# Patient Record
Sex: FEMALE | Race: WHITE | Employment: FULL TIME | URBAN - NONMETROPOLITAN AREA
[De-identification: names, ages, dates, MRNs, and addresses within clinical notes are randomized per-mention and may not be internally consistent; named-entity substitution may affect disease eponyms.]

---

## 2017-01-19 DIAGNOSIS — F33.9 EPISODE OF RECURRENT MAJOR DEPRESSIVE DISORDER, UNSPECIFIED DEPRESSION EPISODE SEVERITY (HCC): Chronic | ICD-10-CM

## 2017-01-23 RX ORDER — DULOXETIN HYDROCHLORIDE 60 MG/1
60 CAPSULE, DELAYED RELEASE ORAL DAILY
Qty: 30 CAPSULE | Refills: 0 | Status: SHIPPED | OUTPATIENT
Start: 2017-01-23 | End: 2017-01-26 | Stop reason: SDUPTHER

## 2017-01-26 ENCOUNTER — OFFICE VISIT (OUTPATIENT)
Dept: FAMILY MEDICINE CLINIC | Age: 42
End: 2017-01-26

## 2017-01-26 VITALS
DIASTOLIC BLOOD PRESSURE: 82 MMHG | HEIGHT: 67 IN | BODY MASS INDEX: 37.83 KG/M2 | HEART RATE: 80 BPM | WEIGHT: 241 LBS | SYSTOLIC BLOOD PRESSURE: 128 MMHG

## 2017-01-26 DIAGNOSIS — F33.9 EPISODE OF RECURRENT MAJOR DEPRESSIVE DISORDER, UNSPECIFIED DEPRESSION EPISODE SEVERITY (HCC): Chronic | ICD-10-CM

## 2017-01-26 DIAGNOSIS — M54.50 LUMBAR PAIN WITH RADIATION DOWN RIGHT LEG: ICD-10-CM

## 2017-01-26 DIAGNOSIS — M79.604 LUMBAR PAIN WITH RADIATION DOWN RIGHT LEG: ICD-10-CM

## 2017-01-26 PROCEDURE — G8484 FLU IMMUNIZE NO ADMIN: HCPCS | Performed by: FAMILY MEDICINE

## 2017-01-26 PROCEDURE — 99213 OFFICE O/P EST LOW 20 MIN: CPT | Performed by: FAMILY MEDICINE

## 2017-01-26 PROCEDURE — 4004F PT TOBACCO SCREEN RCVD TLK: CPT | Performed by: FAMILY MEDICINE

## 2017-01-26 PROCEDURE — G8427 DOCREV CUR MEDS BY ELIG CLIN: HCPCS | Performed by: FAMILY MEDICINE

## 2017-01-26 PROCEDURE — G8419 CALC BMI OUT NRM PARAM NOF/U: HCPCS | Performed by: FAMILY MEDICINE

## 2017-01-26 RX ORDER — GABAPENTIN 300 MG/1
300 CAPSULE ORAL NIGHTLY PRN
Qty: 90 CAPSULE | Refills: 1 | Status: SHIPPED | OUTPATIENT
Start: 2017-01-26 | End: 2017-12-08

## 2017-01-26 RX ORDER — LAMOTRIGINE 50 MG/1
50 TABLET, ORALLY DISINTEGRATING ORAL NIGHTLY
Qty: 30 TABLET | Refills: 1 | Status: SHIPPED | OUTPATIENT
Start: 2017-01-26 | End: 2017-12-08

## 2017-01-26 RX ORDER — DULOXETIN HYDROCHLORIDE 60 MG/1
60 CAPSULE, DELAYED RELEASE ORAL DAILY
Qty: 90 CAPSULE | Refills: 1 | Status: SHIPPED | OUTPATIENT
Start: 2017-01-26 | End: 2017-08-20 | Stop reason: SDUPTHER

## 2017-01-26 RX ORDER — LORAZEPAM 1 MG/1
1 TABLET ORAL NIGHTLY PRN
Qty: 30 TABLET | Refills: 0 | Status: SHIPPED | OUTPATIENT
Start: 2017-01-26 | End: 2017-12-08

## 2017-01-26 ASSESSMENT — ENCOUNTER SYMPTOMS
SHORTNESS OF BREATH: 0
RESPIRATORY NEGATIVE: 1
GASTROINTESTINAL NEGATIVE: 1

## 2017-04-20 ENCOUNTER — OFFICE VISIT (OUTPATIENT)
Dept: FAMILY MEDICINE CLINIC | Age: 42
End: 2017-04-20

## 2017-04-20 VITALS
BODY MASS INDEX: 38.45 KG/M2 | HEIGHT: 67 IN | HEART RATE: 76 BPM | SYSTOLIC BLOOD PRESSURE: 150 MMHG | DIASTOLIC BLOOD PRESSURE: 97 MMHG | WEIGHT: 245 LBS

## 2017-04-20 DIAGNOSIS — M54.50 LUMBAR PAIN WITH RADIATION DOWN RIGHT LEG: ICD-10-CM

## 2017-04-20 DIAGNOSIS — M79.604 LUMBAR PAIN WITH RADIATION DOWN RIGHT LEG: ICD-10-CM

## 2017-04-20 DIAGNOSIS — S22.32XA CLOSED FRACTURE OF ONE RIB OF LEFT SIDE, INITIAL ENCOUNTER: Primary | ICD-10-CM

## 2017-04-20 DIAGNOSIS — M54.42 ACUTE BILATERAL LOW BACK PAIN WITH LEFT-SIDED SCIATICA: ICD-10-CM

## 2017-04-20 PROCEDURE — G8427 DOCREV CUR MEDS BY ELIG CLIN: HCPCS | Performed by: FAMILY MEDICINE

## 2017-04-20 PROCEDURE — 4004F PT TOBACCO SCREEN RCVD TLK: CPT | Performed by: FAMILY MEDICINE

## 2017-04-20 PROCEDURE — G8417 CALC BMI ABV UP PARAM F/U: HCPCS | Performed by: FAMILY MEDICINE

## 2017-04-20 PROCEDURE — 99213 OFFICE O/P EST LOW 20 MIN: CPT | Performed by: FAMILY MEDICINE

## 2017-04-20 RX ORDER — HYDROCODONE BITARTRATE AND ACETAMINOPHEN 5; 325 MG/1; MG/1
1 TABLET ORAL EVERY 6 HOURS PRN
Qty: 100 TABLET | Refills: 0 | Status: SHIPPED | OUTPATIENT
Start: 2017-04-20 | End: 2017-12-08

## 2017-04-20 RX ORDER — TIZANIDINE 4 MG/1
4 TABLET ORAL EVERY 8 HOURS PRN
Qty: 90 TABLET | Refills: 1 | Status: SHIPPED | OUTPATIENT
Start: 2017-04-20 | End: 2017-07-18 | Stop reason: SDUPTHER

## 2017-07-18 DIAGNOSIS — M54.50 LUMBAR PAIN WITH RADIATION DOWN RIGHT LEG: ICD-10-CM

## 2017-07-18 DIAGNOSIS — M79.604 LUMBAR PAIN WITH RADIATION DOWN RIGHT LEG: ICD-10-CM

## 2017-07-19 RX ORDER — TIZANIDINE 4 MG/1
TABLET ORAL
Qty: 90 TABLET | Refills: 1 | Status: SHIPPED | OUTPATIENT
Start: 2017-07-19 | End: 2017-12-08

## 2017-08-20 DIAGNOSIS — F33.9 EPISODE OF RECURRENT MAJOR DEPRESSIVE DISORDER, UNSPECIFIED DEPRESSION EPISODE SEVERITY (HCC): Chronic | ICD-10-CM

## 2017-08-21 RX ORDER — DULOXETIN HYDROCHLORIDE 60 MG/1
CAPSULE, DELAYED RELEASE ORAL
Qty: 30 CAPSULE | Refills: 0 | Status: SHIPPED | OUTPATIENT
Start: 2017-08-21 | End: 2017-10-12 | Stop reason: SDUPTHER

## 2017-10-12 DIAGNOSIS — F33.9 EPISODE OF RECURRENT MAJOR DEPRESSIVE DISORDER, UNSPECIFIED DEPRESSION EPISODE SEVERITY (HCC): Chronic | ICD-10-CM

## 2017-10-12 RX ORDER — DULOXETIN HYDROCHLORIDE 60 MG/1
CAPSULE, DELAYED RELEASE ORAL
Qty: 30 CAPSULE | Refills: 0 | Status: SHIPPED | OUTPATIENT
Start: 2017-10-12 | End: 2017-12-08 | Stop reason: SDUPTHER

## 2017-11-22 DIAGNOSIS — F33.9 EPISODE OF RECURRENT MAJOR DEPRESSIVE DISORDER, UNSPECIFIED DEPRESSION EPISODE SEVERITY (HCC): Chronic | ICD-10-CM

## 2017-11-22 RX ORDER — DULOXETIN HYDROCHLORIDE 60 MG/1
CAPSULE, DELAYED RELEASE ORAL
Qty: 30 CAPSULE | Refills: 0 | OUTPATIENT
Start: 2017-11-22

## 2017-11-22 NOTE — TELEPHONE ENCOUNTER
Ramiro Rodriguez called requesting a refill on the following medications:  Requested Prescriptions     Pending Prescriptions Disp Refills    DULoxetine (CYMBALTA) 60 MG extended release capsule 30 capsule 0     Sig: take 1 capsule by mouth once daily     Pharmacy verified:  [email or web address]      Date of last visit: 4/20/2017  Date of next visit (if applicable): Visit date not found

## 2017-12-08 ENCOUNTER — OFFICE VISIT (OUTPATIENT)
Dept: FAMILY MEDICINE CLINIC | Age: 42
End: 2017-12-08
Payer: COMMERCIAL

## 2017-12-08 VITALS
BODY MASS INDEX: 38.45 KG/M2 | HEART RATE: 86 BPM | HEIGHT: 67 IN | DIASTOLIC BLOOD PRESSURE: 90 MMHG | WEIGHT: 245 LBS | SYSTOLIC BLOOD PRESSURE: 158 MMHG

## 2017-12-08 DIAGNOSIS — I10 ESSENTIAL HYPERTENSION: Primary | ICD-10-CM

## 2017-12-08 DIAGNOSIS — F33.9 EPISODE OF RECURRENT MAJOR DEPRESSIVE DISORDER, UNSPECIFIED DEPRESSION EPISODE SEVERITY (HCC): Chronic | ICD-10-CM

## 2017-12-08 PROCEDURE — 99213 OFFICE O/P EST LOW 20 MIN: CPT | Performed by: FAMILY MEDICINE

## 2017-12-08 PROCEDURE — G8484 FLU IMMUNIZE NO ADMIN: HCPCS | Performed by: FAMILY MEDICINE

## 2017-12-08 PROCEDURE — G8417 CALC BMI ABV UP PARAM F/U: HCPCS | Performed by: FAMILY MEDICINE

## 2017-12-08 PROCEDURE — G8427 DOCREV CUR MEDS BY ELIG CLIN: HCPCS | Performed by: FAMILY MEDICINE

## 2017-12-08 PROCEDURE — 4004F PT TOBACCO SCREEN RCVD TLK: CPT | Performed by: FAMILY MEDICINE

## 2017-12-08 RX ORDER — QUETIAPINE FUMARATE 25 MG/1
25 TABLET, FILM COATED ORAL 2 TIMES DAILY
Qty: 60 TABLET | Refills: 1 | Status: SHIPPED | OUTPATIENT
Start: 2017-12-08 | End: 2018-03-12

## 2017-12-08 RX ORDER — LISINOPRIL 5 MG/1
5 TABLET ORAL DAILY
Qty: 90 TABLET | Refills: 1 | Status: SHIPPED | OUTPATIENT
Start: 2017-12-08 | End: 2018-05-24 | Stop reason: SDUPTHER

## 2017-12-08 RX ORDER — DULOXETIN HYDROCHLORIDE 60 MG/1
CAPSULE, DELAYED RELEASE ORAL
Qty: 90 CAPSULE | Refills: 1 | Status: SHIPPED | OUTPATIENT
Start: 2017-12-08 | End: 2018-05-24 | Stop reason: SDUPTHER

## 2017-12-08 NOTE — PROGRESS NOTES
Name:  Cristian Huertas  :    1975    Chief Complaint   Patient presents with    Medication Refill       HPI:  Having a lot more stress. Changed jobs and happy with that.  has alcohol issues. She did better on Cymbalta and wants to continue. She does not follow her own medical issues well. Known goiter, but no US done. No lobas. She has followed BP that is high and she has avoided treatment. Active. Good diet. Physical Exam:      BP (!) 158/90 (Site: Left Arm, Position: Sitting, Cuff Size: Large Adult)   Pulse 86   Ht 5' 7\" (1.702 m)   Wt 245 lb (111.1 kg)   BMI 38.37 kg/m²     Anxious. Logical.  Some anger. No tremors. Neck with large goiter. Non tender. No nodes. Lungs are clear. Heart in RRR with no murmur. No tremors. No edema. Assessment/Plan:      Depression/PETAR  Goiter  New HTN    Continue Cymbalta. Add PRN Seroquel. Start Prinivil. Jason Olson was seen today for medication refill. Diagnoses and all orders for this visit:    Essential hypertension  -     lisinopril (PRINIVIL;ZESTRIL) 5 MG tablet; Take 1 tablet by mouth daily    Episode of recurrent major depressive disorder, unspecified depression episode severity (HCC)  -     DULoxetine (CYMBALTA) 60 MG extended release capsule; take 1 capsule by mouth once daily  -     QUEtiapine (SEROQUEL) 25 MG tablet;  Take 1 tablet by mouth 2 times daily

## 2018-03-12 ENCOUNTER — OFFICE VISIT (OUTPATIENT)
Dept: FAMILY MEDICINE CLINIC | Age: 43
End: 2018-03-12
Payer: COMMERCIAL

## 2018-03-12 VITALS
HEART RATE: 88 BPM | SYSTOLIC BLOOD PRESSURE: 138 MMHG | BODY MASS INDEX: 39.62 KG/M2 | DIASTOLIC BLOOD PRESSURE: 86 MMHG | WEIGHT: 252.4 LBS | HEIGHT: 67 IN

## 2018-03-12 DIAGNOSIS — F43.0 ACUTE REACTION TO SITUATIONAL STRESS: Primary | ICD-10-CM

## 2018-03-12 PROCEDURE — 4004F PT TOBACCO SCREEN RCVD TLK: CPT | Performed by: FAMILY MEDICINE

## 2018-03-12 PROCEDURE — G8417 CALC BMI ABV UP PARAM F/U: HCPCS | Performed by: FAMILY MEDICINE

## 2018-03-12 PROCEDURE — G8484 FLU IMMUNIZE NO ADMIN: HCPCS | Performed by: FAMILY MEDICINE

## 2018-03-12 PROCEDURE — 99213 OFFICE O/P EST LOW 20 MIN: CPT | Performed by: FAMILY MEDICINE

## 2018-03-12 PROCEDURE — G8427 DOCREV CUR MEDS BY ELIG CLIN: HCPCS | Performed by: FAMILY MEDICINE

## 2018-03-12 RX ORDER — LORAZEPAM 1 MG/1
1 TABLET ORAL 2 TIMES DAILY PRN
Qty: 20 TABLET | Refills: 1 | Status: SHIPPED | OUTPATIENT
Start: 2018-03-12 | End: 2018-03-22

## 2018-03-12 ASSESSMENT — PATIENT HEALTH QUESTIONNAIRE - PHQ9
2. FEELING DOWN, DEPRESSED OR HOPELESS: 1
1. LITTLE INTEREST OR PLEASURE IN DOING THINGS: 0
SUM OF ALL RESPONSES TO PHQ QUESTIONS 1-9: 1
SUM OF ALL RESPONSES TO PHQ9 QUESTIONS 1 & 2: 1

## 2018-03-12 NOTE — PROGRESS NOTES
Name:  Solomon Aquino  :    1975    Chief Complaint   Patient presents with    Anxiety       HPI:  Doing well in general.  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 He is in ICU and may not recover. She may take home on hospice. Not sleeping. Major anxiety, but no panic. Physical Exam:      /86   Pulse 88   Ht 5' 7\" (1.702 m)   Wt 252 lb 6.4 oz (114.5 kg)   BMI 39.53 kg/m²     Anxious. Lungs are clear. Heart in RRR with no murmur. No tremors. Assessment/Plan:      Situational Reaction. Short Ativan PRN    Controlled Substances Monitoring: The Prescription Monitoring Report for this patient was reviewed today. (Alivia Henning MD)    No signs of potential drug abuse or diversion identified. (Alivia Henning MD)                       There are no diagnoses linked to this encounter.

## 2018-05-24 ENCOUNTER — OFFICE VISIT (OUTPATIENT)
Dept: FAMILY MEDICINE CLINIC | Age: 43
End: 2018-05-24
Payer: COMMERCIAL

## 2018-05-24 VITALS
WEIGHT: 242.2 LBS | BODY MASS INDEX: 38.01 KG/M2 | HEART RATE: 92 BPM | DIASTOLIC BLOOD PRESSURE: 72 MMHG | HEIGHT: 67 IN | SYSTOLIC BLOOD PRESSURE: 138 MMHG

## 2018-05-24 DIAGNOSIS — I10 ESSENTIAL HYPERTENSION: Chronic | ICD-10-CM

## 2018-05-24 DIAGNOSIS — F33.9 EPISODE OF RECURRENT MAJOR DEPRESSIVE DISORDER, UNSPECIFIED DEPRESSION EPISODE SEVERITY (HCC): Chronic | ICD-10-CM

## 2018-05-24 PROCEDURE — G8417 CALC BMI ABV UP PARAM F/U: HCPCS | Performed by: FAMILY MEDICINE

## 2018-05-24 PROCEDURE — 4004F PT TOBACCO SCREEN RCVD TLK: CPT | Performed by: FAMILY MEDICINE

## 2018-05-24 PROCEDURE — 99213 OFFICE O/P EST LOW 20 MIN: CPT | Performed by: FAMILY MEDICINE

## 2018-05-24 PROCEDURE — G8427 DOCREV CUR MEDS BY ELIG CLIN: HCPCS | Performed by: FAMILY MEDICINE

## 2018-05-24 RX ORDER — LISINOPRIL 5 MG/1
5 TABLET ORAL DAILY
Qty: 90 TABLET | Refills: 1 | Status: SHIPPED | OUTPATIENT
Start: 2018-05-24 | End: 2018-08-06 | Stop reason: CLARIF

## 2018-05-24 RX ORDER — DULOXETIN HYDROCHLORIDE 60 MG/1
CAPSULE, DELAYED RELEASE ORAL
Qty: 90 CAPSULE | Refills: 1 | Status: SHIPPED | OUTPATIENT
Start: 2018-05-24 | End: 2018-08-06 | Stop reason: CLARIF

## 2018-05-24 ASSESSMENT — ENCOUNTER SYMPTOMS
RESPIRATORY NEGATIVE: 1
GASTROINTESTINAL NEGATIVE: 1
SHORTNESS OF BREATH: 0

## 2018-08-06 ENCOUNTER — OFFICE VISIT (OUTPATIENT)
Dept: FAMILY MEDICINE CLINIC | Age: 43
End: 2018-08-06
Payer: COMMERCIAL

## 2018-08-06 VITALS
HEART RATE: 80 BPM | DIASTOLIC BLOOD PRESSURE: 80 MMHG | SYSTOLIC BLOOD PRESSURE: 144 MMHG | BODY MASS INDEX: 33.55 KG/M2 | WEIGHT: 214.2 LBS

## 2018-08-06 DIAGNOSIS — F43.22 ADJUSTMENT DISORDER WITH ANXIOUS MOOD: Primary | ICD-10-CM

## 2018-08-06 PROCEDURE — G8427 DOCREV CUR MEDS BY ELIG CLIN: HCPCS | Performed by: FAMILY MEDICINE

## 2018-08-06 PROCEDURE — 99213 OFFICE O/P EST LOW 20 MIN: CPT | Performed by: FAMILY MEDICINE

## 2018-08-06 PROCEDURE — G8417 CALC BMI ABV UP PARAM F/U: HCPCS | Performed by: FAMILY MEDICINE

## 2018-08-06 PROCEDURE — 4004F PT TOBACCO SCREEN RCVD TLK: CPT | Performed by: FAMILY MEDICINE

## 2018-08-06 RX ORDER — ALPRAZOLAM 0.5 MG/1
0.5 TABLET ORAL 2 TIMES DAILY PRN
Qty: 30 TABLET | Refills: 0 | Status: SHIPPED | OUTPATIENT
Start: 2018-08-06 | End: 2018-09-07 | Stop reason: SDUPTHER

## 2018-08-06 NOTE — PROGRESS NOTES
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 Marital issues for two months and filed for divorce. He is alcoholic. She had to get a restraining order on him due to threatening behaviors. She had tapered off Cymbalta but had some sharp zingers. .  This was weeks before. Has lost appetite and weight down. She sees that as a good thing. Can't sleep. Physical Exam:      BP (!) 144/80 (Site: Left Arm, Position: Sitting, Cuff Size: Large Adult)   Pulse 80   Wt 214 lb 3.2 oz (97.2 kg)   BMI 33.55 kg/m²     Anxious. Talks in [facility]. Logical.  Angry but in control. Normal thyroid. Lungs are clear. Heart in RRR with no murmur. Assessment/Plan:      Controlled Substances Monitoring:     RX Monitoring 2018   Attestation The Prescription Monitoring Report for this patient was reviewed today. Documentation Possible medication side effects, risk of tolerance/dependence & alternative treatments discussed. ;No signs of potential drug abuse or diversion identified. Situational reaction with weight loss and insomnia. Short term Xanax. Jesica Child was seen today for anxiety. Diagnoses and all orders for this visit:    Adjustment disorder with anxious mood  -     ALPRAZolam (XANAX) 0.5 MG tablet; Take 1 tablet by mouth 2 times daily as needed for Sleep or Anxiety for up to 30 days. [place]

## 2018-09-07 ENCOUNTER — OFFICE VISIT (OUTPATIENT)
Dept: FAMILY MEDICINE CLINIC | Age: 43
End: 2018-09-07
Payer: COMMERCIAL

## 2018-09-07 VITALS
BODY MASS INDEX: 33.59 KG/M2 | DIASTOLIC BLOOD PRESSURE: 80 MMHG | WEIGHT: 214 LBS | HEIGHT: 67 IN | HEART RATE: 64 BPM | SYSTOLIC BLOOD PRESSURE: 122 MMHG

## 2018-09-07 DIAGNOSIS — F43.22 ADJUSTMENT DISORDER WITH ANXIOUS MOOD: ICD-10-CM

## 2018-09-07 PROCEDURE — G8417 CALC BMI ABV UP PARAM F/U: HCPCS | Performed by: FAMILY MEDICINE

## 2018-09-07 PROCEDURE — 4004F PT TOBACCO SCREEN RCVD TLK: CPT | Performed by: FAMILY MEDICINE

## 2018-09-07 PROCEDURE — 99213 OFFICE O/P EST LOW 20 MIN: CPT | Performed by: FAMILY MEDICINE

## 2018-09-07 PROCEDURE — G8427 DOCREV CUR MEDS BY ELIG CLIN: HCPCS | Performed by: FAMILY MEDICINE

## 2018-09-07 RX ORDER — ALPRAZOLAM 0.5 MG/1
0.5 TABLET ORAL 2 TIMES DAILY PRN
Qty: 30 TABLET | Refills: 0 | Status: SHIPPED | OUTPATIENT
Start: 2018-09-07 | End: 2020-08-07 | Stop reason: SDUPTHER

## 2018-09-07 RX ORDER — CITALOPRAM 40 MG/1
40 TABLET ORAL DAILY
Qty: 30 TABLET | Refills: 1 | Status: SHIPPED | OUTPATIENT
Start: 2018-09-07 | End: 2018-11-04 | Stop reason: SDUPTHER

## 2018-09-07 RX ORDER — PREDNISONE 10 MG/1
10 TABLET ORAL 2 TIMES DAILY
Qty: 14 TABLET | Refills: 0 | Status: SHIPPED | OUTPATIENT
Start: 2018-09-07 | End: 2018-09-14

## 2018-11-05 RX ORDER — CITALOPRAM 40 MG/1
TABLET ORAL
Qty: 30 TABLET | Refills: 1 | Status: SHIPPED | OUTPATIENT
Start: 2018-11-05 | End: 2019-09-23

## 2019-09-23 ENCOUNTER — HOSPITAL ENCOUNTER (EMERGENCY)
Age: 44
Discharge: HOME | End: 2019-09-23
Attending: NURSE PRACTITIONER
Payer: COMMERCIAL

## 2019-09-23 VITALS
SYSTOLIC BLOOD PRESSURE: 138 MMHG | RESPIRATION RATE: 18 BRPM | HEIGHT: 67 IN | HEART RATE: 83 BPM | TEMPERATURE: 96.2 F | OXYGEN SATURATION: 94 % | BODY MASS INDEX: 34.53 KG/M2 | DIASTOLIC BLOOD PRESSURE: 73 MMHG | WEIGHT: 220 LBS

## 2019-09-23 DIAGNOSIS — M62.830 SPASM OF LUMBAR PARASPINOUS MUSCLE: ICD-10-CM

## 2019-09-23 DIAGNOSIS — M25.551 ACUTE RIGHT HIP PAIN: Primary | ICD-10-CM

## 2019-09-23 PROCEDURE — 99202 OFFICE O/P NEW SF 15 MIN: CPT

## 2019-09-23 PROCEDURE — 99213 OFFICE O/P EST LOW 20 MIN: CPT | Performed by: NURSE PRACTITIONER

## 2019-09-23 PROCEDURE — 96372 THER/PROPH/DIAG INJ SC/IM: CPT

## 2019-09-23 PROCEDURE — 6360000002 HC RX W HCPCS: Performed by: NURSE PRACTITIONER

## 2019-09-23 RX ORDER — METAXALONE 800 MG/1
800 TABLET ORAL 3 TIMES DAILY
Qty: 30 TABLET | Refills: 0 | Status: SHIPPED | OUTPATIENT
Start: 2019-09-23 | End: 2019-09-23 | Stop reason: ALTCHOICE

## 2019-09-23 RX ORDER — KETOROLAC TROMETHAMINE 30 MG/ML
60 INJECTION, SOLUTION INTRAMUSCULAR; INTRAVENOUS ONCE
Status: COMPLETED | OUTPATIENT
Start: 2019-09-23 | End: 2019-09-23

## 2019-09-23 RX ORDER — ORPHENADRINE CITRATE 30 MG/ML
60 INJECTION INTRAMUSCULAR; INTRAVENOUS ONCE
Status: COMPLETED | OUTPATIENT
Start: 2019-09-23 | End: 2019-09-23

## 2019-09-23 RX ORDER — CYCLOBENZAPRINE HCL 10 MG
10 TABLET ORAL 3 TIMES DAILY PRN
Qty: 30 TABLET | Refills: 0 | OUTPATIENT
Start: 2019-09-23 | End: 2019-10-01 | Stop reason: ALTCHOICE

## 2019-09-23 RX ADMIN — KETOROLAC TROMETHAMINE 60 MG: 30 INJECTION, SOLUTION INTRAMUSCULAR at 11:21

## 2019-09-23 RX ADMIN — ORPHENADRINE CITRATE 60 MG: 30 INJECTION INTRAMUSCULAR; INTRAVENOUS at 11:21

## 2019-09-23 ASSESSMENT — PAIN DESCRIPTION - PAIN TYPE: TYPE: ACUTE PAIN

## 2019-09-23 ASSESSMENT — PAIN DESCRIPTION - LOCATION
LOCATION: HIP
LOCATION: HIP

## 2019-09-23 ASSESSMENT — ENCOUNTER SYMPTOMS
TROUBLE SWALLOWING: 0
ABDOMINAL PAIN: 0
BACK PAIN: 1
SHORTNESS OF BREATH: 0

## 2019-09-23 ASSESSMENT — PAIN DESCRIPTION - FREQUENCY: FREQUENCY: INTERMITTENT

## 2019-09-23 ASSESSMENT — PAIN SCALES - GENERAL
PAINLEVEL_OUTOF10: 10
PAINLEVEL_OUTOF10: 4
PAINLEVEL_OUTOF10: 3
PAINLEVEL_OUTOF10: 10

## 2019-09-23 ASSESSMENT — PAIN DESCRIPTION - PROGRESSION: CLINICAL_PROGRESSION: GRADUALLY IMPROVING

## 2019-09-23 ASSESSMENT — PAIN DESCRIPTION - DESCRIPTORS
DESCRIPTORS: SPASM
DESCRIPTORS: ACHING

## 2019-09-23 ASSESSMENT — PAIN DESCRIPTION - ORIENTATION: ORIENTATION: RIGHT

## 2019-09-23 NOTE — ED NOTES
Patient verbalized understanding of discharge instructions and medications prescribed. Denies questions or concerns at this time.       Rhiannon Boston RN  09/23/19 [id number]

## 2019-09-23 NOTE — ED PROVIDER NOTES
60 mg and Toradol 60 mg IM in the urgent care with significant improvement in her symptoms. She asked for another injection of Norflex it was explained to her that the medication needs to be a 12 hours apart. I did tell her I would give her a prescription for a muscle relaxant. She said Skelaxin works the best for her. Advised her to continue ice or heat to relieve the spasm do gentle stretching  Prescription: Skelaxin 800 mg 3 times daily as needed spasm dispense 30 no refills. Pharmacist notified this provider that Skelaxin is not covered under her current plan. The prescription was changed to Flexeril 10 mg 1 p.o. 3 times daily as needed spasm #30 no refills prescription was called to the pharmacist.  Follow-up: Make an appointment with your primary care provider if not improving  Work slip for today.   PATIENT REFERRED TO:  Jaspal Guadarrama MD  [address] [place] [address],[address]  [facility]  [phone]    Schedule an appointment as soon as possible for a visit   for recheck    DISCHARGE MEDICATIONS:  Discharge Medication List as of 9/23/2019 12:17 PM      START taking these medications    Details   metaxalone (SKELAXIN) 800 MG tablet Take 1 tablet by mouth 3 times daily for 10 days, Disp-30 tablet, R-0Print           Discharge Medication List as of 9/23/2019 12:17 PM          DARY Cheung CNP, APRN - CNP  09/23/19 [id number]

## 2019-10-01 ENCOUNTER — OFFICE VISIT (OUTPATIENT)
Dept: FAMILY MEDICINE CLINIC | Age: 44
End: 2019-10-01
Payer: COMMERCIAL

## 2019-10-01 VITALS
WEIGHT: 244.8 LBS | DIASTOLIC BLOOD PRESSURE: 82 MMHG | BODY MASS INDEX: 38.42 KG/M2 | HEIGHT: 67 IN | HEART RATE: 84 BPM | SYSTOLIC BLOOD PRESSURE: 134 MMHG

## 2019-10-01 DIAGNOSIS — F42.9 OBSESSIVE-COMPULSIVE DISORDER, UNSPECIFIED TYPE: ICD-10-CM

## 2019-10-01 DIAGNOSIS — S39.012D STRAIN OF LUMBAR REGION, SUBSEQUENT ENCOUNTER: Primary | ICD-10-CM

## 2019-10-01 PROCEDURE — G8427 DOCREV CUR MEDS BY ELIG CLIN: HCPCS | Performed by: FAMILY MEDICINE

## 2019-10-01 PROCEDURE — 99213 OFFICE O/P EST LOW 20 MIN: CPT | Performed by: FAMILY MEDICINE

## 2019-10-01 PROCEDURE — 4004F PT TOBACCO SCREEN RCVD TLK: CPT | Performed by: FAMILY MEDICINE

## 2019-10-01 PROCEDURE — G8417 CALC BMI ABV UP PARAM F/U: HCPCS | Performed by: FAMILY MEDICINE

## 2019-10-01 PROCEDURE — G8484 FLU IMMUNIZE NO ADMIN: HCPCS | Performed by: FAMILY MEDICINE

## 2019-10-01 RX ORDER — PREDNISONE 10 MG/1
20 TABLET ORAL 2 TIMES DAILY
Qty: 28 TABLET | Refills: 0 | Status: SHIPPED | OUTPATIENT
Start: 2019-10-01 | End: 2019-10-08

## 2019-10-01 RX ORDER — FLUOXETINE HYDROCHLORIDE 20 MG/1
20 CAPSULE ORAL DAILY
Qty: 90 CAPSULE | Refills: 1 | Status: SHIPPED | OUTPATIENT
Start: 2019-10-01 | End: 2020-04-01

## 2019-10-01 RX ORDER — ORPHENADRINE CITRATE 100 MG/1
100 TABLET, EXTENDED RELEASE ORAL 2 TIMES DAILY
Qty: 20 TABLET | Refills: 0 | Status: SHIPPED | OUTPATIENT
Start: 2019-10-01 | End: 2019-10-11

## 2020-03-10 RX ORDER — FLUOXETINE HYDROCHLORIDE 20 MG/1
CAPSULE ORAL
Qty: 90 CAPSULE | Refills: 1 | OUTPATIENT
Start: 2020-03-10

## 2020-06-29 RX ORDER — FLUOXETINE HYDROCHLORIDE 20 MG/1
CAPSULE ORAL
Qty: 90 CAPSULE | Refills: 0 | OUTPATIENT
Start: 2020-06-29

## 2020-07-08 ENCOUNTER — VIRTUAL VISIT (OUTPATIENT)
Dept: FAMILY MEDICINE CLINIC | Age: 45
End: 2020-07-08
Payer: COMMERCIAL

## 2020-07-08 PROCEDURE — G8427 DOCREV CUR MEDS BY ELIG CLIN: HCPCS | Performed by: FAMILY MEDICINE

## 2020-07-08 PROCEDURE — 99213 OFFICE O/P EST LOW 20 MIN: CPT | Performed by: FAMILY MEDICINE

## 2020-07-08 RX ORDER — FLUOXETINE HYDROCHLORIDE 40 MG/1
40 CAPSULE ORAL DAILY
Qty: 90 CAPSULE | Refills: 0 | Status: SHIPPED | OUTPATIENT
Start: 2020-07-08 | End: 2020-08-07 | Stop reason: SDUPTHER

## 2020-07-08 NOTE — PROGRESS NOTES
2020    TELEHEALTH EVALUATION -- Audio/Visual (During [facility])    HPI:    Rolly Smith (:  1975) has requested an audio/video evaluation for the following concern(s):    8 Month follow-up anxiety and depression: Patient is on Prozac 20 mg.  working 16 hour shifts. does not have much time to sleep. Mood is worse. Has anxiety and depression. Works the [employer]. Review of Systems   Cardiovascular: Negative for chest pain and palpitations. Psychiatric/Behavioral: Negative for sleep disturbance and suicidal ideas. The patient is nervous/anxious. Prior to Visit Medications    Medication Sig Taking? Authorizing Provider   FLUoxetine (PROZAC) 20 MG capsule take 1 capsule by mouth once daily Yes Whit Sultana MD       Social History     Tobacco Use    Smoking status: Current Every Day Smoker     Packs/day: 2.00     Types: Cigarettes    Smokeless tobacco: Never Used   Substance Use Topics    Alcohol use: No    Drug use: No        No Known Allergies,   Past Medical History:   Diagnosis Date    Anxiety     Depression     Gout        PHYSICAL EXAMINATION:  [ INSTRUCTIONS:  \"[x]\" Indicates a positive item  \"[]\" Indicates a negative item  -- DELETE ALL ITEMS NOT EXAMINED]  Constitutional: [x] Appears well-developed and well-nourished [x] No apparent distress      [x] Abnormal- appears tired. Mental status  [x] Alert and awake  [] Oriented to person/place/time [x]Able to follow commands      Sclera  [x]  Normal  [] Abnormal -         Discharge [x]  None visible  [] Abnormal -    HENT:   [x] Normocephalic, atraumatic.   [] Abnormal   [x] Mouth/Throat: Mucous membranes are moist.        Neck: [x] No visualized mass     Pulmonary/Chest: [x] Respiratory effort normal.  [x] No visualized signs of difficulty breathing or respiratory distress        [] Abnormal-         Skin:        [x] No significant exanthematous lesions or discoloration noted on facial skin         [] Abnormal-            Psychiatric:       [] Normal Affect [x] No Hallucinations        [x] Abnormal- mood is good. Mildly anxious and depressed. Other pertinent observable physical exam findings-     ASSESSMENT/PLAN:  1. Moderate episode of recurrent major depressive disorder (HCC)  Chronic. Mood is worse. Increase Prozac dose to 40 mg. Most of this is due to her not sleeping as much and increased stress at work  - FLUoxetine (PROZAC) 40 MG capsule; Take 1 capsule by mouth daily  Dispense: 90 capsule; Refill: 0    2. Anxiety  - FLUoxetine (PROZAC) 40 MG capsule; Take 1 capsule by mouth daily  Dispense: 90 capsule; Refill: 0      Return in about 6 months (around 1/8/2021) for Well+mood. Ale Burton is a 40 y.o. female being evaluated by a Virtual Visit (video visit) encounter to address concerns as mentioned above. A caregiver was present when appropriate. Due to this being a TeleHealth encounter (During VDUVU-67 public health emergency), evaluation of the following organ systems was limited: Vitals/Constitutional/EENT/Resp/CV/GI//MS/Neuro/Skin/Heme-Lymph-Imm. Pursuant to the emergency declaration under the [address] [place] [address] authority and the [employer] Act, this Virtual Visit was conducted with patient's (and/or legal guardian's) consent, to reduce the patient's risk of exposure to COVID-19 and provide necessary medical care. The patient (and/or legal guardian) has also been advised to contact this office for worsening conditions or problems, and seek emergency medical treatment and/or call 911 if deemed necessary. Patient identification was verified at the start of the visit: Yes    Total time spent on this encounter: Not billed by time    Services were provided through a video synchronous discussion virtually to substitute for in-person clinic visit.  Patient and provider were located at their individual homes. --Lynsey Lara MD on 7/8/2020 at 6:57 AM    An electronic signature was used to authenticate this note.

## 2020-08-07 ENCOUNTER — VIRTUAL VISIT (OUTPATIENT)
Dept: FAMILY MEDICINE CLINIC | Age: 45
End: 2020-08-07
Payer: COMMERCIAL

## 2020-08-07 PROCEDURE — 99213 OFFICE O/P EST LOW 20 MIN: CPT | Performed by: FAMILY MEDICINE

## 2020-08-07 PROCEDURE — G8417 CALC BMI ABV UP PARAM F/U: HCPCS | Performed by: FAMILY MEDICINE

## 2020-08-07 PROCEDURE — G8427 DOCREV CUR MEDS BY ELIG CLIN: HCPCS | Performed by: FAMILY MEDICINE

## 2020-08-07 PROCEDURE — 4004F PT TOBACCO SCREEN RCVD TLK: CPT | Performed by: FAMILY MEDICINE

## 2020-08-07 RX ORDER — FLUOXETINE HYDROCHLORIDE 40 MG/1
80 CAPSULE ORAL DAILY
Qty: 90 CAPSULE | Refills: 0 | Status: SHIPPED | OUTPATIENT
Start: 2020-08-07 | End: 2020-09-30

## 2020-08-07 RX ORDER — ALPRAZOLAM 0.5 MG/1
0.5 TABLET ORAL 2 TIMES DAILY PRN
Qty: 30 TABLET | Refills: 0 | Status: SHIPPED | OUTPATIENT
Start: 2020-08-07 | End: 2020-08-28 | Stop reason: SDUPTHER

## 2020-08-07 ASSESSMENT — ENCOUNTER SYMPTOMS
COUGH: 0
SHORTNESS OF BREATH: 0

## 2020-08-07 NOTE — PROGRESS NOTES
[address] [place] [address] [address], [address], [place]  Phone:  [phone]  Fax:  [phone]    2020    TELEHEALTH EVALUATION -- Audio/Visual (During EDVLG-17 public health emergency)    HPI:    Yogesh Blood (:  1975) has requested an audio/video evaluation for the following concern(s): Anxiety, depression    Ava's mother had brain cancer with mets to her spine. 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 Yaron oBoker is beside herself. Her mother was her best friend. She's not eating or sleeping. She is crying constantly. She knows she has to grieve but is asking for something to help her relax and maybe get some sleep. Review of Systems   Respiratory: Negative for cough and shortness of breath. Cardiovascular: Negative for chest pain and palpitations. Psychiatric/Behavioral: Positive for dysphoric mood and sleep disturbance. Negative for self-injury and suicidal ideas. The patient is nervous/anxious. Prior to Visit Medications    Medication Sig Taking? Authorizing Provider   FLUoxetine (PROZAC) 40 MG capsule Take 2 capsules by mouth daily Yes Renetta Conte MD   ALPRAZolam [facility]) 0.5 MG tablet Take 1 tablet by mouth 2 times daily as needed for Sleep or Anxiety for up to 30 days.  Yes Renetta Conte MD       Social History     Tobacco Use    Smoking status: Current Every Day Smoker     Packs/day: 2.00     Types: Cigarettes    Smokeless tobacco: Never Used   Substance Use Topics    Alcohol use: No    Drug use: No        No Known Allergies,   Past Medical History:   Diagnosis Date    Anxiety     Depression     Gout        PHYSICAL EXAMINATION:    Constitutional: [x] Appears well-developed and well-nourished [x] No apparent distress      [] Abnormal-   Mental status  [x] Alert and awake  [] Oriented to person/place/time []Able to follow commands      Eyes:  EOM for up to 30 days. Dispense: 30 tablet; Refill: 0  - [address] [facility] [address], [facility]      Return in about 1 month (around 9/7/2020) for anxiety, depression. Jose Nunn is a 40 y.o. female being evaluated by a Virtual Visit (video visit) encounter to address concerns as mentioned above. A caregiver was present when appropriate. Due to this being a TeleHealth encounter (During TDDIA-56 public health emergency), evaluation of the following organ systems was limited: Vitals/Constitutional/EENT/Resp/CV/GI//MS/Neuro/Skin/Heme-Lymph-Imm. Pursuant to the emergency declaration under the [address] [place] [address], [address] [place] [address] authority and the [employer] and [employer] Act, this Virtual Visit was conducted with patient's (and/or legal guardian's) consent, to reduce the patient's risk of exposure to COVID-19 and provide necessary medical care. The patient (and/or legal guardian) has also been advised to contact this office for worsening conditions or problems, and seek emergency medical treatment and/or call 911 if deemed necessary. Patient identification was verified at the start of the visit: Yes    Services were provided through a video synchronous discussion virtually to substitute for in-person clinic visit. Patient and provider were located at their individual homes. --Irena Runner, MD on 8/7/2020 at 10:01 AM    An electronic signature was used to authenticate this note.

## 2020-08-07 NOTE — LETTER
[address] Medicine  [address] [facility]  Phone: [phone]  Fax: [phone]    Jana Omalley MD        August 7, 2020     Patient: Samaria Dunne   YOB: 1975   Date of Visit: 8/7/2020       To Whom It May Concern: It is my medical opinion that Isidra Edu missed work today for medical reasons. If you have any questions or concerns, please don't hesitate to call.     Sincerely,    Jana Omalley MD

## 2020-08-17 ENCOUNTER — VIRTUAL VISIT (OUTPATIENT)
Dept: BEHAVIORAL/MENTAL HEALTH CLINIC | Age: 45
End: 2020-08-17

## 2020-08-17 PROCEDURE — 90791 PSYCH DIAGNOSTIC EVALUATION: CPT | Performed by: SOCIAL WORKER

## 2020-08-17 NOTE — PROGRESS NOTES
Behavioral Health Consultation  Ed JULIAN Greenberg  8/17/2020  11:29 AM EDT      Time spent with Patient: 40  minutes  This is patient's first  [facility] appointment. Reason for Consult:    Chief Complaint   Patient presents with    Other     grief reaction    Anxiety    Depression     Referring Provider: Glory Rivas MD  [address] [facility]    Pt provided informed consent for the behavioral health program. Discussed with patient model of service to include the limits of confidentiality (i.e. abuse reporting, suicide intervention, etc.) and short-term intervention focused approach. Pt indicated understanding. Feedback given to PCP. [facility]  [facility]  Dept: [phone]  Dept Fax: [phone]      TELEHEALTH EVALUATION -- Audio/Visual (During NPEUB-38 public health emergency)  This visit was performed via a synchronous telecommunication system. Pt location: Home ([place], [place])  Provider location: Home Office ([address] [place] [address])  Pt notified that this was a virtual visit and that we will submit a claim for reimbursement to their insurance company. They were made aware that any copays, coinsurance amounts, or other amounts not covered will be their responsibility. Pt accepted? yes  1  Pursuant to the emergency declaration under the [facility] waiver authority and the [employer] and [employer] Act, this Virtual  Visit was conducted, with patient's consent, to reduce the patient's risk of exposure to COVID-19 and provide continuity of care for an established patient. Services were provided through a video synchronous discussion virtually to substitute for in-person clinic visit.        S:  Pt identified the presenting problems as symptoms of grief, depression, and anxiety and indicated this as her primary needs  to address through behavioral health suicidal ideation    History:    TOBACCO:   reports that she has been smoking cigarettes. She has been smoking about 2.00 packs per day. She has never used smokeless tobacco.  ETOH:   reports no history of alcohol use. Family History:   Family History   Problem Relation Age of Onset    Asthma Neg Hx     Cancer Neg Hx     Heart Disease Neg Hx     Kidney Disease Neg Hx     Stroke Neg Hx        A:       Diagnosis:    1. Grief reaction    2. Anxiety    3. Moderate episode of recurrent major depressive disorder ([facility] [address]          Diagnosis Date    Anxiety     Depression     Gout        Plan: Pt will attend a follow up appointment next week to assess symptoms and evaluate effectiveness of coping skills. Pt interventions:  Provided handout on  depression, anxiety, stress and grief reaction, Trained in relaxation strategies and Discussed self-care (sleep, nutrition, rewarding activities, social support, exercise)    Pt Behavioral Change Plan:   1. Pt will read handouts about grief, anxiety, stress, and grief. 2. Pt will promote effective self care habits daily/weekly-routine, rest, physical outlets, time to decompress, supportive relationships. 3. Pt will practice deep breathing activities 2-3x's daily for 3-5 minutes. \  4. Pt will follow up with JULIAN Mercedes next week.

## 2020-08-17 NOTE — PATIENT INSTRUCTIONS
if it means just listening. It is often very helpful to talk about your loss with people who will allow you to express your emotions. 3. Take care of your health. Often after a loss, we stop doing the things we need to for health care, such as exercising, eating correctly, keeping Dr. appointments, or taking prescribed medications. If you are on a health care regimen, it is important to continue to adhere to your treatment. 4. Postpone major life changes. Give yourself time to adjust to your loss before making plans to change jobs, move or sell your home, remarry, etc.  Grief can sometimes cloud your judgment and ability to make decisions. 5. Consider keeping a journal.  It is often helpful to write or tell the story of your loss and what it means to you as a way to work through your feelings. 6. Participate in activities. Staying active through exercise, enjoyable activities, outings with supportive others, or even starting new hobbies can help us get through tough times while providing opportunities for constructive development and use of energy. 7. Find a way to memorialize your loved one. Planting a tree or garden in the name of your loved one, dedicating a work to their memory, contributing to a fortunato in their name, and other such activities can be helpful. 8. Consider joining grief-support groups or contacting a grief counselor for additional support and help. Remember that depressive symptoms (feeling sad) are a fundamental part of normal bereavement. Staying active and finding support from others can help you to work through the grief process. Myths and facts about grief  MYTH: The pain will go away faster if you ignore it. Fact: Trying to ignore your pain or keep it from surfacing will only make it worse in the long run. For real healing it is necessary to face your grief and actively deal with it. MYTH: Its important to be be strong in the face of loss.    Fact: Feeling sad, frightened, or lonely is a normal reaction to loss. Crying doesnt mean you are weak. You dont need to protect your family or friends by putting on a brave front. Showing your true feelings can help them and you. MYTH: If you dont cry, it means you arent sorry about the loss. Fact: Crying is a normal response to sadness, but its not the only one. Those who dont cry may feel the pain just as deeply as others. They may simply have other ways of showing it. MYTH: Grief should last about a year. Fact: There is no right or wrong time frame for grieving. How long it takes can differ from person to person. Source: [facility]         References  Memory Christina,  MJ, Jones Baugh T, SAM Love, Melita HC, & JORGE Chan. (2005). Does widowhood affect memory performance of older persons? Psychological Medicine, 35(2), 217-226. Nikki Schneider, INDERJIT De Leon, & GEOFF Lopez. (2005). Health behaviors associated with better quality of life for older bereaved persons. Journal of Palliative Medicine, 8(1), . Beronica Mariscal.  (2004). Working with grieving adults. Advances in Psychiatric Treatment, 10, 164-170. Rhiannon Artis JT, Alfonso, [address] [place] [address], & Alan Garcia. (2004). Life after death: Greif therapy after the suddent traumatic death of a family member. Perspectives in Psychiatric Care, 40(4), 095-380. [facility] Jong, HERRERA Escobar, & CLAUDE Quijano.  (2000). Bereavement services in acute care settings. Death Studies, 24, 51-64. Vanesa Palmer, [address] [place], [place]  (2000). Psychotherapy of traumatic grief:  A review of evidence for psychotherapeutic treatment. Death Studies, 24, 479-495. JOSSELINE Cotto. (2004). Connections between counseling theories and current theories of grief and mourning. Journal of Mental Health Counseling, 26(2), 125-145.  2. The Stress Response and How It Can Affect You   The stress response, or fight or flight response is the emergency reaction system of the body.  It is there to keep you safe in emergencies. The stress response includes physical and thought responses to your perception of various situations. When the stress response is turned on, your body may release substances like adrenaline and cortisol. Your organs are programmed to respond in certain ways to situations that are viewed as challenging or threatening. The stress response can work against you. You can turn it on when you dont really need it and, as a result, perceive something as an emergency when its really not. It can turn on when you are just thinking about past or future events. Harmless, chronic conditions can be intensified by the stress response activating too often, with too much intensity, or for too long. Stress responses can be different for different individuals. Below is a list of some common stress related responses people have. (Sinclair the responses you have had in the last 2 weeks.)                                                                                                 Physical Responses   Muscle aches   Insomnia   ? Heart rate   Headache   Weight gain   Nausea   Constipation   Dry mouth   Muscle twitching  Weight loss   Low energy   Weakness   Tight chest   Diarrhea   Dizziness   Trembling   Stomach cramps  Chills    Hot flashes   Sweating   Pounding heart  Choking feeling  Chest pain   Leg cramps   Numb hands/feet Dry throat   Appetite change  Face flushing   ? Blood pressure  Light-headedness  Feeling faint        Trouble swallowing   Rash ?    Urination   Neck pain             Tingling hands/feet                                                                                             Emotional and Thought Responses   Restlessness   Agitation   Insecurity             Worthlessness   Anxiety   Stress    Depression             Hopelessness   Guilt    Defensiveness  Anger            Racing thoughts   Nightmares   Intense thinking  Sensitivity            Expecting the worst   Numbness   Lack of motivation  Mood swings             Forgetfulness   ? Concentration  Rigidity              Preoccupation  Intolerance                                                Behavioral Responses   Avoidance   Withdrawal   Neglect   ? Alcohol use    Smoking   ? Eating   Arguing        Poor appearance   ? Spending   Poor hygiene   ? Eating   Seeking reassurance   Nail biting   Skin picking   ? Talking         ? Body checking   Sexual problems  Foot tapping   Fidgeting Rapid walking    ? Exercise   Teeth clenching           Multitasking   Aggressive speaking       ? Fun activities  ? Sleeping      ? Relaxing activities     Seeking information     The parasympathetic nervous system in your body is designed to turn on your bodys relaxation response. Your behaviors and thinking can keep your bodys natural relaxation response from operating at its best.     Getting your body to relax on a daily basis for at least brief periods can help decrease unpleasant stress responses. Learning to relax your body, through specific breathing and relaxation exercises as well as by minimizing stressful thinking, can help your bodys natural relaxation system be more effective. 3.   Deep Breathing Exercises [facility]      Exercise 1:  The Stimulating Breath (also called the Dominic Breath)   Its aim is to raise energy level and increase alertness. - Inhale and exhale rapidly through your nose, keeping your mouth closed but relaxed. Your breaths in and out should be equal in duration, but as short as possible. This is a noisy breathing exercise. - Try for three in-and-out breath cycles per second. This produces a quick movement of the diaphragm, suggesting a dominic. Breathe normally after each cycle. - Do not do for more than 15 seconds on your first try. Each time you practice the Stimulating Breath, you can increase your time by five seconds or so, until you reach a full minute.   If done properly, you may feel invigorated, comparable to the heightened awareness you feel after a good workout. You should feel the effort at the back of the neck, the diaphragm, the chest and the abdomen. Try this breathing exercise the next time you need an energy boost and feel yourself reaching for a cup of coffee. Exercise 2:  The 4-7-8 (or Relaxing Breath) Exercise  This exercise is utterly simple, takes almost no time, requires no equipment and can be done anywhere. Although you can do the exercise in any position, sit with your back straight while learning the exercise. Place the tip of your tongue against the ridge of tissue just behind your upper front teeth, and keep it there through the entire exercise. You will be exhaling through your mouth around your tongue; try pursing your lips slightly if this seems awkward.  Exhale completely through your mouth, making a whoosh sound.  Close your mouth and inhale quietly through your nose to a mental count of four.  Hold your breath for a count of seven.  Exhale completely through your mouth, making a whoosh sound to a count of eight.  This is one breath. Now inhale again and repeat the cycle three more times for a total of four breaths. Note that you always inhale quietly through your nose and exhale audibly through your mouth. The tip of your tongue stays in position the whole time. Exhalation takes twice as long as inhalation. The absolute time you spend on each phase is not important; the ratio of 4:7:8 is important. If you have trouble holding your breath, speed the exercise up but keep to the ratio of 4:7:8 for the three phases. With practice you can slow it all down and get used to inhaling and exhaling more and more deeply. This exercise is a natural tranquilizer for the nervous system.  Unlike tranquilizing drugs, which are often effective when you first take them but then lose their power over time, this exercise is subtle when you first try it but gains in power with repetition and practice. Do it at least twice a day. You cannot do it too frequently. Do NOT do more than 4 breaths at one time for the first month of practice. Later, if you wish, you can extend it to eight breaths. If you feel a little lightheaded when you first breathe this way, do not be concerned; it will pass. Once you develop this technique by practicing it every day, it will be a very useful tool that you will always have with you. Use it whenever anything upsetting happens - before you react. Use it whenever you are aware of internal tension. Use it to help you fall asleep. This exercise cannot be recommended too highly. Everyone can benefit from it. Exercise 3: Meditation exercise  Breath Counting  If you want to get a feel for this challenging work, try your hand at breath counting, a deceptively simple technique. Sit in a comfortable position with the spine straight and head inclined slightly forward. Gently close your eyes and take a few deep breaths. Then let the breath come naturally without trying to influence it. Ideally it will be quiet and slow, but depth and rhythm may vary.  To begin the exercise, count \"one\" to yourself as you exhale.  The next time you exhale, count \"two,\" and so on up to Joe. \"   Then begin a new cycle, counting \"one\" on the next exhalation. Never count higher than \"five,\" and count only when you exhale. You will know your attention has wandered when you find yourself up to \"eight,\" \"12,\" even \"19. \"  Try to do 10 minutes of this form of meditation. 4. Pt will prioritize effective self care habits daily/weekly- effective sleep, time to decompress, balanced life style, enjoyable activities. 5. Pt will follow up with JULIAN Dai next week.

## 2020-08-25 ENCOUNTER — VIRTUAL VISIT (OUTPATIENT)
Dept: BEHAVIORAL/MENTAL HEALTH CLINIC | Age: 45
End: 2020-08-25

## 2020-08-25 PROCEDURE — 90832 PSYTX W PT 30 MINUTES: CPT | Performed by: SOCIAL WORKER

## 2020-08-25 NOTE — PROGRESS NOTES
responsibilities and functioning. Pt assessed things that have been helpful promoting stability and resiliency. She determined trying to prioritize good self care and permitting herself to let emotions surface routinely. Pt further verbalized the role her mother played in her life and within the family structure. She indicated some evidence that her role in the family has shifted with her mother's passing , in other's seeking advisement and support from her. Pt reports how this loss has required her to reassess things that are important to her and evoke change. Pt was advised of indications of depressive symptoms and instructed to monitor if symptoms worsen or interfere with daily functioning, to consider following-up with either your primary care team or a behavioral health provider for possible counseling or medication management. If suicidal thoughts are experienced, call 911. An additional 24/7 resource is the [facility] [employer] at [phone] ([phone]). Pt will attend a follow up appointment next week. O:  MSE:    Appearance    cooperative  Appetite abnormal: poor  Sleep disturbance Yes  Fatigue Yes  Loss of pleasure Yes  Impulsive behavior No  Speech    normal volume  Mood    euthymic   Affect    normal affect  Thought Content    intact  Thought Process    coherent  Associations    logical connections  Insight    Fair  Judgment    Intact  Orientation    oriented to person, place, time, and general circumstances  Memory    recent and remote memory intact  Attention/Concentration    intact  Morbid ideation No  Suicide Assessment    no suicidal ideation    History:    TOBACCO:   reports that she has been smoking cigarettes. She has been smoking about 2.00 packs per day. She has never used smokeless tobacco.  ETOH:   reports no history of alcohol use.   Family History:   Family History   Problem Relation Age of Onset    Asthma Neg Hx     Cancer Neg Hx     Heart Disease Neg Hx     Kidney Disease Neg Hx     Stroke Neg Hx        A:       Diagnosis:    1. Grief reaction    2. Anxiety    3. Moderate episode of recurrent major depressive disorder ([facility] [address]          Diagnosis Date    Anxiety     Depression     Gout        Plan: Pt will attend a follow up appointment next week to assess symptoms and evaluate effectiveness of coping skills. Pt interventions:  Reviewed handouts about grief, anxiety, depression, and self care. Pt Behavioral Change Plan:   1. Pt will assess life changes to promote balance. 2. Pt will promote effective self care habits daily/weekly-routine, rest, physical outlets, time to decompress, supportive relationships. 3. Pt will practice deep breathing activities 2-3x's daily for 3-5 minutes. \  4. Pt will follow up with JULIAN Mercedes next week.

## 2020-08-28 RX ORDER — ALPRAZOLAM 0.5 MG/1
0.5 TABLET ORAL 2 TIMES DAILY PRN
Qty: 30 TABLET | Refills: 0 | Status: SHIPPED | OUTPATIENT
Start: 2020-08-28 | End: 2021-12-21 | Stop reason: SDUPTHER

## 2020-08-28 NOTE — TELEPHONE ENCOUNTER
Yogesh Blood called requesting a refill on the following medications:  Requested Prescriptions     Pending Prescriptions Disp Refills    ALPRAZolam (XANAX) 0.5 MG tablet 30 tablet 0     Sig: Take 1 tablet by mouth 2 times daily as needed for Sleep or Anxiety for up to 30 days.        Date of last visit: 8/7/2020  Date of next visit (if applicable):N/A  Date of last refill: 08/07/2020  Pharmacy Name: [address] [place], [place] [address]

## 2020-08-28 NOTE — TELEPHONE ENCOUNTER
Delma Fagan called requesting a refill on the following medications:  Requested Prescriptions     Pending Prescriptions Disp Refills    ALPRAZolam (XANAX) 0.5 MG tablet 30 tablet 0     Sig: Take 1 tablet by mouth 2 times daily as needed for Sleep or Anxiety for up to 30 days. Pharmacy verified: [facility] in [place]  . pv      Date of last visit: 8/07/2020  Date of next visit (if applicable): Visit date not found    Pt is seeing a psyc in [place] that was referred to

## 2020-09-30 RX ORDER — FLUOXETINE HYDROCHLORIDE 40 MG/1
CAPSULE ORAL
Qty: 90 CAPSULE | Refills: 1 | Status: SHIPPED | OUTPATIENT
Start: 2020-09-30 | End: 2021-11-04

## 2020-10-07 ENCOUNTER — NURSE TRIAGE (OUTPATIENT)
Dept: OTHER | Facility: CLINIC | Age: 45
End: 2020-10-07

## 2020-10-07 ENCOUNTER — OFFICE VISIT (OUTPATIENT)
Dept: FAMILY MEDICINE CLINIC | Age: 45
End: 2020-10-07

## 2020-10-07 VITALS
BODY MASS INDEX: 31.39 KG/M2 | DIASTOLIC BLOOD PRESSURE: 92 MMHG | TEMPERATURE: 98.3 F | HEIGHT: 67 IN | HEART RATE: 100 BPM | WEIGHT: 200 LBS | SYSTOLIC BLOOD PRESSURE: 144 MMHG | OXYGEN SATURATION: 98 %

## 2020-10-07 PROCEDURE — 96372 THER/PROPH/DIAG INJ SC/IM: CPT | Performed by: FAMILY MEDICINE

## 2020-10-07 PROCEDURE — 99214 OFFICE O/P EST MOD 30 MIN: CPT | Performed by: FAMILY MEDICINE

## 2020-10-07 RX ORDER — PREDNISONE 20 MG/1
20 TABLET ORAL 2 TIMES DAILY
Qty: 14 TABLET | Refills: 0 | Status: SHIPPED | OUTPATIENT
Start: 2020-10-07 | End: 2020-10-14

## 2020-10-07 RX ORDER — BUSPIRONE HYDROCHLORIDE 5 MG/1
5 TABLET ORAL 3 TIMES DAILY
Qty: 90 TABLET | Refills: 0 | Status: SHIPPED | OUTPATIENT
Start: 2020-10-07 | End: 2020-11-06

## 2020-10-07 RX ORDER — EPINEPHRINE 0.3 MG/.3ML
0.3 INJECTION SUBCUTANEOUS ONCE
Qty: 0.3 ML | Refills: 0 | Status: SHIPPED | OUTPATIENT
Start: 2020-10-07 | End: 2022-03-29

## 2020-10-07 RX ORDER — METHYLPREDNISOLONE ACETATE 80 MG/ML
80 INJECTION, SUSPENSION INTRA-ARTICULAR; INTRALESIONAL; INTRAMUSCULAR; SOFT TISSUE ONCE
Status: COMPLETED | OUTPATIENT
Start: 2020-10-07 | End: 2020-10-07

## 2020-10-07 RX ADMIN — METHYLPREDNISOLONE ACETATE 80 MG: 80 INJECTION, SUSPENSION INTRA-ARTICULAR; INTRALESIONAL; INTRAMUSCULAR; SOFT TISSUE at 16:04

## 2020-10-07 ASSESSMENT — ENCOUNTER SYMPTOMS
BACK PAIN: 0
NAUSEA: 1
CHEST TIGHTNESS: 1
VOMITING: 1
WHEEZING: 1
SHORTNESS OF BREATH: 1

## 2020-10-07 NOTE — PROGRESS NOTES
10/7/2020    Jhon Matos (:  1975) is a 39 y.o. female, here for evaluation of the following medical concerns:    Patient claims bug bite on left forearm yesterday at 3:30. Patient claimed she immediately had sensation of vomiting and increased swelling to this arm. 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 She treated herself with 100mg of benadryll 3 times in the past day. She states that her heart has been racing and that she was unable to sleep for the entire last night. Patient claims pruritis to the left forearm right now, no pain is present. Additionally, patient is mourning the loss of her mother recently and is seeking a refill of her xanax. Review of Systems   Constitutional: Negative for chills and fever. Respiratory: Positive for chest tightness, shortness of breath and wheezing. Cardiovascular: Positive for palpitations. Negative for chest pain. Gastrointestinal: Positive for nausea and vomiting. Genitourinary: Negative for dysuria and pelvic pain. Musculoskeletal: Negative for arthralgias, back pain and gait problem. Skin: Positive for rash and wound. Neurological: Positive for dizziness and syncope (Patient felt as if she was going to faint but did not actually faint). Prior to Visit Medications    Medication Sig Taking?  Authorizing Provider   FLUoxetine (PROZAC) 40 MG capsule take 1 capsule by mouth once daily Yes Ceci Melvin MD        Allergies   Allergen Reactions    Bee Venom        Past Medical History:   Diagnosis Date    Anxiety     Depression     Gout        Past Surgical History:   Procedure Laterality Date    ECTOPIC PREGNANCY SURGERY      ECTOPIC PREGNANCY SURGERY         Social History     Socioeconomic History    Marital status:      Spouse name: Not on file    Number of children: Not on file    Years of education: Not on file    Highest education level: Not on file Occupational History    Not on file   Social Needs    Financial resource strain: Not on file    Food insecurity     Worry: Not on file     Inability: Not on file    Transportation needs     Medical: Not on file     Non-medical: Not on file   Tobacco Use    Smoking status: Current Every Day Smoker     Packs/day: 2.00     Types: Cigarettes    Smokeless tobacco: Never Used   Substance and Sexual Activity    Alcohol use: No    Drug use: No    Sexual activity: Never   Lifestyle    Physical activity     Days per week: Not on file     Minutes per session: Not on file    Stress: Not on file   Relationships    Social connections     Talks on phone: Not on file     Gets together: Not on file     Attends Protestant service: Not on file     Active member of club or organization: Not on file     Attends meetings of clubs or organizations: Not on file     Relationship status: Not on file    Intimate partner violence     Fear of current or ex partner: Not on file     Emotionally abused: Not on file     Physically abused: Not on file     Forced sexual activity: Not on file   Other Topics Concern    Not on file   Social History Narrative    Not on file        Family History   Problem Relation Age of Onset    Asthma Neg Hx     Cancer Neg Hx     Heart Disease Neg Hx     Kidney Disease Neg Hx     Stroke Neg Hx        Vitals:    10/07/20 1417   BP: (!) 144/92   Site: Left Upper Arm   Position: Sitting   Cuff Size: Large Adult   Pulse: 100   Temp: 98.3 °F (36.8 °C)   TempSrc: Temporal   SpO2: 98%   Weight: 200 lb (90.7 kg)   Height: 5' 7\" (1.702 m)     Estimated body mass index is 31.32 kg/m² as calculated from the following:    Height as of this encounter: 5' 7\" (1.702 m). Weight as of this encounter: 200 lb (90.7 kg). Physical Exam  Constitutional:       Comments: Patient acutely agitated in exam room   HENT:      Head: Normocephalic.       Mouth/Throat:      Mouth: Mucous membranes are moist.      Pharynx: Oropharynx is clear. Cardiovascular:      Rate and Rhythm: Regular rhythm. Tachycardia present. Pulses: Normal pulses. Heart sounds: Normal heart sounds. Pulmonary:      Effort: Pulmonary effort is normal.      Breath sounds: Normal breath sounds. Skin:     General: Skin is warm. Capillary Refill: Capillary refill takes less than 2 seconds. Findings: Erythema and lesion present. Dermatologic: Patient has a small lesion with scab present to distal anterior aspect of left forearm. Erythema and swelling present over entire anterior aspect of distal forearm stopping at the left hand distally and just distal to the left elbow proximally. Left arm warmer than right. ASSESSMENT/PLAN:  1. Allergic reaction, initial encounter  - Patient seen and evaluated  - methylPREDNISolone acetate (DEPO-MEDROL) injection 80 mg given in clinic today  - predniSONE (DELTASONE) 20 MG tablet; Take 1 tablet by mouth 2 times daily for 7 days  Dispense: 14 tablet; Refill: 0  - EPINEPHrine (EPIPEN 2-OLU) 0.3 MG/0.3ML SOAJ injection; Inject 0.3 mLs into the muscle once for 1 dose Use as directed for allergic reaction  Dispense: 0.3 mL; Refill: 0  - Discussed with patient that epi-pen is to be used for emergent situations only, and that should it be used, she must go to ER afterward for continuing management of allergic reaction  - Patient verbalizes understanding    2. Anxiety  - busPIRone (BUSPAR) 5 MG tablet; Take 1 tablet by mouth 3 times daily  Dispense: 90 tablet; Refill: 0  - Discussed with patient the need to transition away from xanax to other medication  - Patient agrees to beginning buspar    No follow-ups on file. An  electronic signature was used to authenticate this note.     --Gloria Heard DPM on 10/7/2020 at 2:23 PM

## 2020-10-07 NOTE — LETTER
[address] Medicine  [address] [facility]  Phone: [phone]  Fax: [phone]    Valarie Flanagan MD        October 7, 2020     Patient: Dee Barriga   YOB: 1975   Date of Visit: 10/7/2020       To Whom It May Concern: It is my medical opinion that Phillis Barthel should remain off work today and return to work tomorrow. If you have any questions or concerns, please don't hesitate to call.     Sincerely,          Valarie Flanagan MD

## 2020-10-07 NOTE — TELEPHONE ENCOUNTER
Pt states had Sob yesterday and took benadryl no sob today    Reason for Disposition   Not a bee, wasp, hornet, or yellow jacket sting   Red or very tender (to touch) area, and started over 24 hours after the bite    Answer Assessment - Initial Assessment Questions  1. TYPE: \"What type of sting was it? \" (bee, yellow jacket, etc.)        Unsure    2. ONSET: \"When did it occur? \"       Yesterday    3. LOCATION: \"Where is the sting located? \"  \"How many stings? \"      Arm    4. SWELLING SIZE: \"How big is the swelling? \" (e.g., inches or cm)      Swelling    5. REDNESS: \"Is the area red or pink? \" If so, ask \"What size is area of redness? \" (e.g., inches or cm). \"When did the redness start? \"      Redness    6. PAIN: \"Is there any pain? \" If so, ask: \"How bad is it? \"  (Scale 1-10; or mild, moderate, severe)      Tight    7. ITCHING: \"Is there any itching? \" If so, ask: \"How bad is it? \"       Itching    8. RESPIRATORY DISTRESS: \"Describe your breathing. \"      No problems today    9. PRIOR REACTIONS: \"Have you had any severe allergic reactions to stings in the past?\" if yes, ask: \"What happened? \"      Yes    10. OTHER SYMPTOMS: \"Do you have any other symptoms? \" (e.g., abdominal pain, face or tongue swelling, new rash elsewhere, vomiting)        Sob yesterday    11. PREGNANCY: \"Is there any chance you are pregnant? \" \"When was your last menstrual period? \"        *No Answer*    Protocols used: BEE OR YELLOW JACKET STING-ADULT-[place], INSECT BITE-ADULT-[place]    Call transferred to [facility]

## 2021-11-02 ENCOUNTER — TELEPHONE (OUTPATIENT)
Dept: FAMILY MEDICINE CLINIC | Age: 46
End: 2021-11-02

## 2021-11-03 NOTE — PROGRESS NOTES
[address]  [address] [place] [address]  Phone:  [phone]          Name: Ivana Bai  : 1975    Chief Complaint   Patient presents with    Other     black discharge from right breast     Hip Pain     leg pain, fatgiue. discuss possible fibromyalgia. HPI:     Ivana Bai is a 55 y.o. female who presents today for evaluation of nipple discharge. In August she started with some green nipple discharge. She used Neosporin but it hasn't improved. The discharge has turned to black. She has never had a mammogram and doesn't want to do them. Her maternal grandmother had breast cancer. Recently she's been getting muscle spasms in her buttocks that shoot down her leg. Symptoms began when her mother  last year. Her stress level has been very high. She was on Prozac but stopped it months ago. She doesn't sleep at night with 60 mg of Melatonin. She also has restless legs. A weighted blanket didn't help. Current Outpatient Medications:     amitriptyline (ELAVIL) 25 MG tablet, Take 1 tablet by mouth nightly, Disp: 30 tablet, Rfl: 1    sulfamethoxazole-trimethoprim (BACTRIM DS) 800-160 MG per tablet, Take 1 tablet by mouth 2 times daily for 10 days, Disp: 20 tablet, Rfl: 0    fluconazole (DIFLUCAN) 150 MG tablet, Take 1 tablet today and repeat in 2 days. , Disp: 2 tablet, Rfl: 0    EPINEPHrine (EPIPEN 2-OLU) 0.3 MG/0.3ML SOAJ injection, Inject 0.3 mLs into the muscle once for 1 dose Use as directed for allergic reaction, Disp: 0.3 mL, Rfl: 0    Allergies   Allergen Reactions    Eggs Or Egg-Derived Products     Bee Venom        Subjective:      Review of Systems   Constitutional: Negative for chills and fever. Musculoskeletal: Positive for myalgias. Psychiatric/Behavioral: Positive for dysphoric mood. Negative for self-injury and suicidal ideas. The patient is nervous/anxious.         Objective:     /84 (Site: Left Upper Arm, Position: Sitting, Cuff Size: Large Adult)   Pulse 94   Temp 97.6 °F (36.4 °C) (Temporal)   Resp 16   Wt 271 lb (122.9 kg)   BMI 42.44 kg/m²     Physical Exam  Vitals reviewed. Constitutional:       General: She is not in acute distress. Appearance: She is well-developed. HENT:      Head: Normocephalic and atraumatic. Nose: Nose normal.   Eyes:      Conjunctiva/sclera: Conjunctivae normal.   Cardiovascular:      Rate and Rhythm: Normal rate and regular rhythm. Heart sounds: Normal heart sounds. Pulmonary:      Effort: Pulmonary effort is normal. No respiratory distress. Breath sounds: Normal breath sounds. No wheezing. Chest:      Comments: Scant black discharge from right nipple when milked. Abdominal:      General: Bowel sounds are normal.      Palpations: Abdomen is soft. Musculoskeletal:      Cervical back: Normal range of motion and neck supple. Skin:     General: Skin is warm and dry. Neurological:      Mental Status: She is alert and oriented to person, place, and time. Psychiatric:         Behavior: Behavior normal.       Assessment/Plan:     Maame Avalos was seen today for other and hip pain. Diagnoses and all orders for this visit:    Discharge from right nipple  -     We discussed that her nipple discharge may be from mammary duct ectasia, or even from breast cancer. She declines a mammogram or breast US and is aware of the risks. Will treat with antibiotics to see if symptoms improve. -     sulfamethoxazole-trimethoprim (BACTRIM DS) 800-160 MG per tablet; Take 1 tablet by mouth 2 times daily for 10 days    Antibiotic-induced yeast infection  -     fluconazole (DIFLUCAN) 150 MG tablet; Take 1 tablet today and repeat in 2 days. Fibromyalgia  -     She has been having a lot of muscle and joint pain that is triggered by anxiety and depression so will start on Amitriptyline 25 mg nightly.   She was encouraged to contact our office with worsening mood or medication side effects. -     amitriptyline (ELAVIL) 25 MG tablet; Take 1 tablet by mouth nightly    Health care maintenance  -     CBC With Auto Differential; Future  -     Comprehensive Metabolic Panel; Future  -     Lipid Panel; Future    Family history of hypothyroidism  -     TSH without Reflex; Future        Return in about 1 month (around 12/4/2021) for anxiety, depression.     Electronically signed by Shira Dennison MD on 11/4/2021 at 10:42 AM

## 2021-11-04 ENCOUNTER — OFFICE VISIT (OUTPATIENT)
Dept: FAMILY MEDICINE CLINIC | Age: 46
End: 2021-11-04
Payer: COMMERCIAL

## 2021-11-04 ENCOUNTER — NURSE ONLY (OUTPATIENT)
Dept: LAB | Age: 46
End: 2021-11-04

## 2021-11-04 VITALS
HEART RATE: 94 BPM | SYSTOLIC BLOOD PRESSURE: 128 MMHG | BODY MASS INDEX: 42.44 KG/M2 | TEMPERATURE: 97.6 F | DIASTOLIC BLOOD PRESSURE: 84 MMHG | RESPIRATION RATE: 16 BRPM | WEIGHT: 271 LBS

## 2021-11-04 DIAGNOSIS — T36.95XA ANTIBIOTIC-INDUCED YEAST INFECTION: ICD-10-CM

## 2021-11-04 DIAGNOSIS — Z83.49 FAMILY HISTORY OF HYPOTHYROIDISM: ICD-10-CM

## 2021-11-04 DIAGNOSIS — M79.7 FIBROMYALGIA: ICD-10-CM

## 2021-11-04 DIAGNOSIS — Z00.00 HEALTH CARE MAINTENANCE: ICD-10-CM

## 2021-11-04 DIAGNOSIS — B37.9 ANTIBIOTIC-INDUCED YEAST INFECTION: ICD-10-CM

## 2021-11-04 DIAGNOSIS — N64.52 DISCHARGE FROM RIGHT NIPPLE: Primary | ICD-10-CM

## 2021-11-04 LAB
ALBUMIN SERPL-MCNC: 4.6 G/DL (ref 3.5–5.1)
ALP BLD-CCNC: 66 U/L (ref 38–126)
ALT SERPL-CCNC: 12 U/L (ref 11–66)
ANION GAP SERPL CALCULATED.3IONS-SCNC: 16 MEQ/L (ref 8–16)
AST SERPL-CCNC: 15 U/L (ref 5–40)
BASOPHILS # BLD: 0.9 %
BASOPHILS ABSOLUTE: 0.1 THOU/MM3 (ref 0–0.1)
BILIRUB SERPL-MCNC: < 0.2 MG/DL (ref 0.3–1.2)
BUN BLDV-MCNC: 10 MG/DL (ref 7–22)
CALCIUM SERPL-MCNC: 9.7 MG/DL (ref 8.5–10.5)
CHLORIDE BLD-SCNC: 105 MEQ/L (ref 98–111)
CHOLESTEROL, TOTAL: 298 MG/DL (ref 100–199)
CO2: 21 MEQ/L (ref 23–33)
CREAT SERPL-MCNC: 0.5 MG/DL (ref 0.4–1.2)
EOSINOPHIL # BLD: 3 %
EOSINOPHILS ABSOLUTE: 0.3 THOU/MM3 (ref 0–0.4)
ERYTHROCYTE [DISTWIDTH] IN BLOOD BY AUTOMATED COUNT: 13.2 % (ref 11.5–14.5)
ERYTHROCYTE [DISTWIDTH] IN BLOOD BY AUTOMATED COUNT: 47.6 FL (ref 35–45)
GFR SERPL CREATININE-BSD FRML MDRD: > 90 ML/MIN/1.73M2
GLUCOSE BLD-MCNC: 89 MG/DL (ref 70–108)
HCT VFR BLD CALC: 43.4 % (ref 37–47)
HDLC SERPL-MCNC: 32 MG/DL
HEMOGLOBIN: 14.1 GM/DL (ref 12–16)
IMMATURE GRANS (ABS): 0.04 THOU/MM3 (ref 0–0.07)
IMMATURE GRANULOCYTES: 0.5 %
LDL CHOLESTEROL CALCULATED: ABNORMAL MG/DL
LYMPHOCYTES # BLD: 29.2 %
LYMPHOCYTES ABSOLUTE: 2.5 THOU/MM3 (ref 1–4.8)
MCH RBC QN AUTO: 31.5 PG (ref 26–33)
MCHC RBC AUTO-ENTMCNC: 32.5 GM/DL (ref 32.2–35.5)
MCV RBC AUTO: 97.1 FL (ref 81–99)
MONOCYTES # BLD: 6.3 %
MONOCYTES ABSOLUTE: 0.5 THOU/MM3 (ref 0.4–1.3)
NUCLEATED RED BLOOD CELLS: 0 /100 WBC
PLATELET # BLD: 259 THOU/MM3 (ref 130–400)
PMV BLD AUTO: 10.3 FL (ref 9.4–12.4)
POTASSIUM SERPL-SCNC: 4.3 MEQ/L (ref 3.5–5.2)
RBC # BLD: 4.47 MILL/MM3 (ref 4.2–5.4)
SEG NEUTROPHILS: 60.1 %
SEGMENTED NEUTROPHILS ABSOLUTE COUNT: 5.2 THOU/MM3 (ref 1.8–7.7)
SODIUM BLD-SCNC: 142 MEQ/L (ref 135–145)
TOTAL PROTEIN: 7.4 G/DL (ref 6.1–8)
TRIGL SERPL-MCNC: 459 MG/DL (ref 0–199)
TSH SERPL DL<=0.05 MIU/L-ACNC: 1.42 UIU/ML (ref 0.4–4.2)
WBC # BLD: 8.7 THOU/MM3 (ref 4.8–10.8)

## 2021-11-04 PROCEDURE — 99214 OFFICE O/P EST MOD 30 MIN: CPT | Performed by: FAMILY MEDICINE

## 2021-11-04 RX ORDER — AMITRIPTYLINE HYDROCHLORIDE 25 MG/1
25 TABLET, FILM COATED ORAL NIGHTLY
Qty: 30 TABLET | Refills: 1 | Status: SHIPPED | OUTPATIENT
Start: 2021-11-04 | End: 2021-12-27 | Stop reason: SDUPTHER

## 2021-11-04 RX ORDER — FLUCONAZOLE 150 MG/1
TABLET ORAL
Qty: 2 TABLET | Refills: 0 | Status: SHIPPED | OUTPATIENT
Start: 2021-11-04 | End: 2021-12-21 | Stop reason: ALTCHOICE

## 2021-11-04 RX ORDER — SULFAMETHOXAZOLE AND TRIMETHOPRIM 800; 160 MG/1; MG/1
1 TABLET ORAL 2 TIMES DAILY
Qty: 20 TABLET | Refills: 0 | Status: SHIPPED | OUTPATIENT
Start: 2021-11-04 | End: 2021-11-14

## 2021-11-08 DIAGNOSIS — E78.00 PURE HYPERCHOLESTEROLEMIA: Primary | ICD-10-CM

## 2021-11-08 RX ORDER — ATORVASTATIN CALCIUM 40 MG/1
40 TABLET, FILM COATED ORAL DAILY
Qty: 90 TABLET | Refills: 1 | Status: SHIPPED | OUTPATIENT
Start: 2021-11-08 | End: 2022-05-09

## 2021-12-20 ASSESSMENT — ENCOUNTER SYMPTOMS
VOMITING: 0
NAUSEA: 0
SHORTNESS OF BREATH: 0

## 2021-12-20 NOTE — PROGRESS NOTES
[address]  [address] [place] [address]  Phone:  [phone]          Name: Jennie De La Cruz  : 1975    Chief Complaint   Patient presents with    Discuss Medications       HPI:     Jennie De La Cruz is a 55 y.o. female who presents today for follow up of depression. Her mother passed away and she doesn't feel her Elavil is helping her mood. She's been working a lot as work is short staffed and she has to work the next 14 days straight. She's previously been on Buspar, Celexa, Prozac, Cymbalta, Seroquel, Ativan, and Xanax; the last 2 helped some. Current Outpatient Medications:     amitriptyline (ELAVIL) 50 MG tablet, Take 1 tablet by mouth nightly, Disp: 30 tablet, Rfl: 1    ALPRAZolam (XANAX) 0.5 MG tablet, Take 1 tablet by mouth 2 times daily as needed for Sleep or Anxiety for up to 14 days. , Disp: 28 tablet, Rfl: 0    predniSONE (DELTASONE) 20 MG tablet, Take 2 tablets by mouth daily, Disp: 10 tablet, Rfl: 0    atorvastatin (LIPITOR) 40 MG tablet, Take 1 tablet by mouth daily, Disp: 90 tablet, Rfl: 1    EPINEPHrine (EPIPEN 2-OLU) 0.3 MG/0.3ML SOAJ injection, Inject 0.3 mLs into the muscle once for 1 dose Use as directed for allergic reaction (Patient not taking: Reported on 2021), Disp: 0.3 mL, Rfl: 0    Allergies   Allergen Reactions    Eggs Or Egg-Derived Products     Bee Venom        Subjective:      Review of Systems   Respiratory: Positive for cough. Negative for shortness of breath. Cardiovascular: Negative for chest pain and palpitations. Gastrointestinal: Negative for nausea and vomiting. Psychiatric/Behavioral: Positive for dysphoric mood. Negative for self-injury and suicidal ideas. The patient is nervous/anxious.         Objective:     /78 (Site: Right Upper Arm, Position: Sitting, Cuff Size: Large Adult)   Pulse 107   Temp 99.2 °F (37.3 °C) (Temporal)   Resp 16   Ht 5' 7.01\" (1.702 m)   Wt 268 lb (121.6 kg)   SpO2 97% BMI 41.96 kg/m²     Physical Exam  Vitals reviewed. Constitutional:       General: She is not in acute distress. Appearance: She is well-developed. HENT:      Head: Normocephalic and atraumatic. Nose: Nose normal.   Eyes:      Conjunctiva/sclera: Conjunctivae normal.   Cardiovascular:      Rate and Rhythm: Normal rate and regular rhythm. Heart sounds: Normal heart sounds. Pulmonary:      Effort: Pulmonary effort is normal. No respiratory distress. Breath sounds: Normal breath sounds. No wheezing. Abdominal:      General: Bowel sounds are normal. There is no distension. Palpations: Abdomen is soft. Tenderness: There is no abdominal tenderness. Musculoskeletal:      Cervical back: Normal range of motion and neck supple. Skin:     General: Skin is warm and dry. Neurological:      Mental Status: She is alert and oriented to person, place, and time. Psychiatric:         Behavior: Behavior normal.       Assessment/Plan:     Audra Sinha was seen today for discuss medications. Diagnoses and all orders for this visit:    Adjustment disorder with anxious mood  -     She does not feel the Elavil is helping her mood so will increase from 25 mg to 50 mg nightly with Xanax PRN over the holidays. She was encouraged to contact our office with worsening mood or medication side effects.  -     ALPRAZolam (XANAX) 0.5 MG tablet; Take 1 tablet by mouth 2 times daily as needed for Sleep or Anxiety for up to 14 days. -     amitriptyline (ELAVIL) 50 MG tablet; Take 1 tablet by mouth nightly      Return in about 6 weeks (around 2/1/2022) for anxiety, depression.     Electronically signed by Marianne Angel MD on 12/21/2021 at 4:34 PM

## 2021-12-21 ENCOUNTER — OFFICE VISIT (OUTPATIENT)
Dept: FAMILY MEDICINE CLINIC | Age: 46
End: 2021-12-21
Payer: COMMERCIAL

## 2021-12-21 VITALS
BODY MASS INDEX: 42.06 KG/M2 | OXYGEN SATURATION: 97 % | HEIGHT: 67 IN | RESPIRATION RATE: 16 BRPM | HEART RATE: 107 BPM | DIASTOLIC BLOOD PRESSURE: 78 MMHG | WEIGHT: 268 LBS | SYSTOLIC BLOOD PRESSURE: 124 MMHG | TEMPERATURE: 99.2 F

## 2021-12-21 DIAGNOSIS — M79.7 FIBROMYALGIA: ICD-10-CM

## 2021-12-21 DIAGNOSIS — F43.22 ADJUSTMENT DISORDER WITH ANXIOUS MOOD: ICD-10-CM

## 2021-12-21 PROCEDURE — 99213 OFFICE O/P EST LOW 20 MIN: CPT | Performed by: FAMILY MEDICINE

## 2021-12-21 RX ORDER — PREDNISONE 20 MG/1
40 TABLET ORAL DAILY
Qty: 10 TABLET | Refills: 0 | Status: SHIPPED | OUTPATIENT
Start: 2021-12-21 | End: 2022-03-29

## 2021-12-21 RX ORDER — ALPRAZOLAM 0.5 MG/1
0.5 TABLET ORAL 2 TIMES DAILY PRN
Qty: 28 TABLET | Refills: 0 | Status: SHIPPED | OUTPATIENT
Start: 2021-12-21 | End: 2022-03-30 | Stop reason: SDUPTHER

## 2021-12-21 RX ORDER — AMITRIPTYLINE HYDROCHLORIDE 50 MG/1
50 TABLET, FILM COATED ORAL NIGHTLY
Qty: 30 TABLET | Refills: 1 | Status: SHIPPED | OUTPATIENT
Start: 2021-12-21 | End: 2022-02-14

## 2021-12-21 SDOH — ECONOMIC STABILITY: FOOD INSECURITY: WITHIN THE PAST 12 MONTHS, YOU WORRIED THAT YOUR FOOD WOULD RUN OUT BEFORE YOU GOT MONEY TO BUY MORE.: NEVER TRUE

## 2021-12-21 SDOH — ECONOMIC STABILITY: FOOD INSECURITY: WITHIN THE PAST 12 MONTHS, THE FOOD YOU BOUGHT JUST DIDN'T LAST AND YOU DIDN'T HAVE MONEY TO GET MORE.: NEVER TRUE

## 2021-12-21 ASSESSMENT — SOCIAL DETERMINANTS OF HEALTH (SDOH): HOW HARD IS IT FOR YOU TO PAY FOR THE VERY BASICS LIKE FOOD, HOUSING, MEDICAL CARE, AND HEATING?: NOT HARD AT ALL

## 2021-12-21 ASSESSMENT — ENCOUNTER SYMPTOMS: COUGH: 1

## 2021-12-24 DIAGNOSIS — M79.7 FIBROMYALGIA: ICD-10-CM

## 2021-12-27 RX ORDER — AMITRIPTYLINE HYDROCHLORIDE 25 MG/1
25 TABLET, FILM COATED ORAL NIGHTLY
Qty: 30 TABLET | Refills: 1 | Status: SHIPPED | OUTPATIENT
Start: 2021-12-27 | End: 2022-03-29

## 2022-02-13 DIAGNOSIS — M79.7 FIBROMYALGIA: ICD-10-CM

## 2022-02-14 RX ORDER — AMITRIPTYLINE HYDROCHLORIDE 50 MG/1
TABLET, FILM COATED ORAL
Qty: 30 TABLET | Refills: 1 | Status: SHIPPED | OUTPATIENT
Start: 2022-02-14 | End: 2022-03-29 | Stop reason: SDUPTHER

## 2022-03-28 ASSESSMENT — ENCOUNTER SYMPTOMS
SHORTNESS OF BREATH: 0
NAUSEA: 0
VOMITING: 0

## 2022-03-28 NOTE — PROGRESS NOTES
[address]  [address] [place] [address]  Phone:  [phone]          Name: Adalgisa Araujo  : 1975    Chief Complaint   Patient presents with    Depression       HPI:     Adalgisa Araujo is a 55 y.o. female who presents today for follow up of depression. Her mother passed away and she doesn't feel her Elavil is helping her mood. She's previously been on Buspar, Celexa, Prozac, Cymbalta, Seroquel, Ativan, and Xanax; the last 2 helped some. At her last appointment in December her Elavil was increased from 25 mg to 50 mg nightly. This hasn't helped much and she's only getting about 2 straight hours of sleep at night. She hurts all over, including arms, chest, back, legs, and feet. She's taking a lot of Tylenol and Ibuprofen, but it's not helping. She works at [employer] and is constantly on her feet. She doesn't feel like she can deal with the pain anymore. She has no SI or HI, but wants to feel better. Current Outpatient Medications:     Magnesium 500 MG CAPS, Take by mouth, Disp: , Rfl:     cyclobenzaprine (FLEXERIL) 10 MG tablet, Take 1 tablet by mouth 3 times daily as needed for Muscle spasms, Disp: 90 tablet, Rfl: 2    meloxicam (MOBIC) 15 MG tablet, Take 1 tablet by mouth daily, Disp: 30 tablet, Rfl: 2    predniSONE (DELTASONE) 20 MG tablet, Take 2 tablets by mouth daily, Disp: 10 tablet, Rfl: 0    amitriptyline (ELAVIL) 50 MG tablet, Take 2 tablet by mouth every evening, Disp: 30 tablet, Rfl: 1    atorvastatin (LIPITOR) 40 MG tablet, Take 1 tablet by mouth daily, Disp: 90 tablet, Rfl: 1    Allergies   Allergen Reactions    Eggs Or Egg-Derived Products     Bee Venom        Subjective:      Review of Systems   Respiratory: Negative for cough and shortness of breath. Cardiovascular: Negative for chest pain and palpitations. Gastrointestinal: Negative for nausea and vomiting.    Musculoskeletal: Positive for arthralgias, back pain, myalgias and neck pain. Neurological: Negative for weakness and numbness. Psychiatric/Behavioral: Positive for dysphoric mood. Negative for self-injury and suicidal ideas. The patient is nervous/anxious. Objective:     /88 (Site: Right Upper Arm, Position: Sitting, Cuff Size: Large Adult)   Pulse 64   Temp 97.4 °F (36.3 °C) (Temporal)   Resp 16   Ht 5' 7.01\" (1.702 m)   Wt 274 lb (124.3 kg)   BMI 42.90 kg/m²     Physical Exam  Vitals reviewed. Constitutional:       General: She is not in acute distress. Appearance: She is well-developed. HENT:      Head: Normocephalic and atraumatic. Nose: Nose normal.   Eyes:      Conjunctiva/sclera: Conjunctivae normal.   Cardiovascular:      Rate and Rhythm: Normal rate and regular rhythm. Heart sounds: Normal heart sounds. Pulmonary:      Effort: Pulmonary effort is normal. No respiratory distress. Breath sounds: Normal breath sounds. No wheezing. Abdominal:      General: Bowel sounds are normal. There is no distension. Palpations: Abdomen is soft. Tenderness: There is no abdominal tenderness. Musculoskeletal:      Cervical back: Normal range of motion and neck supple. Skin:     General: Skin is warm and dry. Neurological:      Mental Status: She is alert and oriented to person, place, and time. Psychiatric:         Behavior: Behavior normal.       Assessment/Plan:     Jabari Vaz was seen today for depression. Diagnoses and all orders for this visit:    Adjustment disorder with anxious mood  Fibromyalgia  -     She is struggling with anxiety and depression plus worsening widespread pain so will increase Amitriptyline from 50 mg to 100 mg nightly. Will add Flexeril, Mobic, and Prednisone. -     cyclobenzaprine (FLEXERIL) 10 MG tablet; Take 1 tablet by mouth 3 times daily as needed for Muscle spasms  -     meloxicam (MOBIC) 15 MG tablet;  Take 1 tablet by mouth daily  -     predniSONE (DELTASONE) 20 MG tablet; Take 2 tablets by mouth daily  -     amitriptyline (ELAVIL) 50 MG tablet; Take 2 tablet by mouth every evening    Arthritis  -     Will check labs for further evaluation of possible underlying condition causing her pain. -     Sedimentation Rate; Future  -     HECTOR Screen With Reflex; Future  -     Rheumatoid Factor; Future        Return in about 6 weeks (around 5/10/2022) for anxiety, fibromyalgia .     Electronically signed by Fern Jacob MD on 3/29/2022 at 1:20 PM

## 2022-03-29 ENCOUNTER — NURSE ONLY (OUTPATIENT)
Dept: LAB | Age: 47
End: 2022-03-29

## 2022-03-29 ENCOUNTER — OFFICE VISIT (OUTPATIENT)
Dept: FAMILY MEDICINE CLINIC | Age: 47
End: 2022-03-29
Payer: COMMERCIAL

## 2022-03-29 VITALS
HEIGHT: 67 IN | HEART RATE: 64 BPM | DIASTOLIC BLOOD PRESSURE: 88 MMHG | SYSTOLIC BLOOD PRESSURE: 136 MMHG | BODY MASS INDEX: 43 KG/M2 | TEMPERATURE: 97.4 F | WEIGHT: 274 LBS | RESPIRATION RATE: 16 BRPM

## 2022-03-29 DIAGNOSIS — F43.22 ADJUSTMENT DISORDER WITH ANXIOUS MOOD: Primary | ICD-10-CM

## 2022-03-29 DIAGNOSIS — M79.7 FIBROMYALGIA: ICD-10-CM

## 2022-03-29 DIAGNOSIS — M19.90 ARTHRITIS: ICD-10-CM

## 2022-03-29 LAB — RHEUMATOID FACTOR: < 10 IU/ML (ref 0–13)

## 2022-03-29 PROCEDURE — 99214 OFFICE O/P EST MOD 30 MIN: CPT | Performed by: FAMILY MEDICINE

## 2022-03-29 RX ORDER — MELOXICAM 15 MG/1
15 TABLET ORAL DAILY
Qty: 30 TABLET | Refills: 2 | Status: SHIPPED | OUTPATIENT
Start: 2022-03-29

## 2022-03-29 RX ORDER — PREDNISONE 20 MG/1
40 TABLET ORAL DAILY
Qty: 10 TABLET | Refills: 0 | Status: SHIPPED | OUTPATIENT
Start: 2022-03-29

## 2022-03-29 RX ORDER — AMITRIPTYLINE HYDROCHLORIDE 50 MG/1
TABLET, FILM COATED ORAL
Qty: 30 TABLET | Refills: 1
Start: 2022-03-29 | End: 2022-04-13

## 2022-03-29 RX ORDER — CYCLOBENZAPRINE HCL 10 MG
10 TABLET ORAL 3 TIMES DAILY PRN
Qty: 90 TABLET | Refills: 2 | Status: SHIPPED | OUTPATIENT
Start: 2022-03-29 | End: 2022-04-08

## 2022-03-29 RX ORDER — FOLIC ACID 0.8 MG
TABLET ORAL
COMMUNITY

## 2022-03-29 ASSESSMENT — PATIENT HEALTH QUESTIONNAIRE - PHQ9
SUM OF ALL RESPONSES TO PHQ QUESTIONS 1-9: 22
5. POOR APPETITE OR OVEREATING: 3
6. FEELING BAD ABOUT YOURSELF - OR THAT YOU ARE A FAILURE OR HAVE LET YOURSELF OR YOUR FAMILY DOWN: 1
3. TROUBLE FALLING OR STAYING ASLEEP: 3
SUM OF ALL RESPONSES TO PHQ QUESTIONS 1-9: 22
7. TROUBLE CONCENTRATING ON THINGS, SUCH AS READING THE NEWSPAPER OR WATCHING TELEVISION: 3
SUM OF ALL RESPONSES TO PHQ QUESTIONS 1-9: 22
4. FEELING TIRED OR HAVING LITTLE ENERGY: 3
1. LITTLE INTEREST OR PLEASURE IN DOING THINGS: 3
9. THOUGHTS THAT YOU WOULD BE BETTER OFF DEAD, OR OF HURTING YOURSELF: 0
2. FEELING DOWN, DEPRESSED OR HOPELESS: 3
SUM OF ALL RESPONSES TO PHQ9 QUESTIONS 1 & 2: 6
10. IF YOU CHECKED OFF ANY PROBLEMS, HOW DIFFICULT HAVE THESE PROBLEMS MADE IT FOR YOU TO DO YOUR WORK, TAKE CARE OF THINGS AT HOME, OR GET ALONG WITH OTHER PEOPLE: 3
SUM OF ALL RESPONSES TO PHQ QUESTIONS 1-9: 22
8. MOVING OR SPEAKING SO SLOWLY THAT OTHER PEOPLE COULD HAVE NOTICED. OR THE OPPOSITE, BEING SO FIGETY OR RESTLESS THAT YOU HAVE BEEN MOVING AROUND A LOT MORE THAN USUAL: 3

## 2022-03-29 ASSESSMENT — COLUMBIA-SUICIDE SEVERITY RATING SCALE - C-SSRS
6. HAVE YOU EVER DONE ANYTHING, STARTED TO DO ANYTHING, OR PREPARED TO DO ANYTHING TO END YOUR LIFE?: NO
2. HAVE YOU ACTUALLY HAD ANY THOUGHTS OF KILLING YOURSELF?: NO
1. WITHIN THE PAST MONTH, HAVE YOU WISHED YOU WERE DEAD OR WISHED YOU COULD GO TO SLEEP AND NOT WAKE UP?: NO

## 2022-03-29 ASSESSMENT — ENCOUNTER SYMPTOMS
COUGH: 0
BACK PAIN: 1

## 2022-03-30 DIAGNOSIS — F43.22 ADJUSTMENT DISORDER WITH ANXIOUS MOOD: ICD-10-CM

## 2022-03-30 LAB — SEDIMENTATION RATE, ERYTHROCYTE: 35 MM/HR (ref 0–20)

## 2022-03-30 RX ORDER — ALPRAZOLAM 0.5 MG/1
0.5 TABLET ORAL 3 TIMES DAILY PRN
Qty: 21 TABLET | Refills: 0 | Status: SHIPPED | OUTPATIENT
Start: 2022-03-30 | End: 2022-04-06

## 2022-04-01 LAB — ANA SCREEN: NORMAL

## 2022-04-13 DIAGNOSIS — M79.7 FIBROMYALGIA: ICD-10-CM

## 2022-04-13 RX ORDER — AMITRIPTYLINE HYDROCHLORIDE 50 MG/1
TABLET, FILM COATED ORAL
Qty: 90 TABLET | Refills: 0 | Status: SHIPPED | OUTPATIENT
Start: 2022-04-13 | End: 2022-07-06

## 2022-05-08 DIAGNOSIS — E78.00 PURE HYPERCHOLESTEROLEMIA: ICD-10-CM

## 2022-05-09 RX ORDER — ATORVASTATIN CALCIUM 40 MG/1
TABLET, FILM COATED ORAL
Qty: 90 TABLET | Refills: 1 | Status: SHIPPED | OUTPATIENT
Start: 2022-05-09 | End: 2022-10-31

## 2022-07-06 DIAGNOSIS — M79.7 FIBROMYALGIA: ICD-10-CM

## 2022-07-06 RX ORDER — AMITRIPTYLINE HYDROCHLORIDE 50 MG/1
TABLET, FILM COATED ORAL
Qty: 90 TABLET | Refills: 0 | Status: SHIPPED | OUTPATIENT
Start: 2022-07-06 | End: 2022-10-03

## 2022-09-13 ENCOUNTER — OFFICE VISIT (OUTPATIENT)
Dept: FAMILY MEDICINE CLINIC | Age: 47
End: 2022-09-13
Payer: COMMERCIAL

## 2022-09-13 VITALS
DIASTOLIC BLOOD PRESSURE: 100 MMHG | HEART RATE: 92 BPM | SYSTOLIC BLOOD PRESSURE: 150 MMHG | WEIGHT: 275 LBS | RESPIRATION RATE: 16 BRPM | TEMPERATURE: 97.1 F | BODY MASS INDEX: 43.06 KG/M2

## 2022-09-13 DIAGNOSIS — M79.7 FIBROMYALGIA: ICD-10-CM

## 2022-09-13 DIAGNOSIS — I10 ESSENTIAL HYPERTENSION: Primary | ICD-10-CM

## 2022-09-13 PROCEDURE — 99214 OFFICE O/P EST MOD 30 MIN: CPT | Performed by: FAMILY MEDICINE

## 2022-09-13 RX ORDER — DULOXETIN HYDROCHLORIDE 60 MG/1
60 CAPSULE, DELAYED RELEASE ORAL DAILY
Qty: 30 CAPSULE | Refills: 1 | Status: SHIPPED | OUTPATIENT
Start: 2022-09-13 | End: 2022-11-04

## 2022-09-13 RX ORDER — PREDNISONE 1 MG/1
5 TABLET ORAL DAILY
Qty: 30 TABLET | Refills: 2 | Status: SHIPPED | OUTPATIENT
Start: 2022-09-13 | End: 2022-09-23

## 2022-09-13 RX ORDER — LISINOPRIL 20 MG/1
20 TABLET ORAL 2 TIMES DAILY
Qty: 60 TABLET | Refills: 0 | Status: SHIPPED | OUTPATIENT
Start: 2022-09-13 | End: 2022-10-06

## 2022-09-13 NOTE — PROGRESS NOTES
[address]  [address] [place] [address]  Phone:  [phone]          Name: Giuseppe Burton  : 1975    Chief Complaint   Patient presents with    Hypertension       HPI:     Giuseppe Burton is a 52 y.o. female who presents today for elevation of elevated blood pressure. She's noticed it's been recently, up to 220/115 at work a few days ago. She had headache at the time. She's used to pain from her fibromyalgia so this isn't much different for her (she doesn't think the Amitriptyline helps). She's not going to quit smoking. Current Outpatient Medications:     DULoxetine (CYMBALTA) 60 MG extended release capsule, Take 1 capsule by mouth daily, Disp: 30 capsule, Rfl: 1    lisinopril (PRINIVIL;ZESTRIL) 20 MG tablet, Take 1 tablet by mouth 2 times daily, Disp: 60 tablet, Rfl: 0    amitriptyline (ELAVIL) 50 MG tablet, take 1 tablet by mouth every evening, Disp: 90 tablet, Rfl: 0    atorvastatin (LIPITOR) 40 MG tablet, take 1 tablet by mouth once daily, Disp: 90 tablet, Rfl: 1    Magnesium 500 MG CAPS, Take by mouth, Disp: , Rfl:     meloxicam (MOBIC) 15 MG tablet, Take 1 tablet by mouth daily, Disp: 30 tablet, Rfl: 2    predniSONE (DELTASONE) 20 MG tablet, Take 2 tablets by mouth daily, Disp: 10 tablet, Rfl: 0    Allergies   Allergen Reactions    Eggs Or Egg-Derived Products     Bee Venom        Subjective:      Review of Systems   Musculoskeletal:  Positive for arthralgias and myalgias. Neurological:  Positive for headaches. Psychiatric/Behavioral:  Positive for dysphoric mood. The patient is nervous/anxious. Objective:     BP (!) 150/100 (Site: Right Upper Arm, Position: Sitting, Cuff Size: Large Adult)   Pulse 92   Temp 97.1 °F (36.2 °C) (Temporal)   Resp 16   Wt 275 lb (124.7 kg)   BMI 43.06 kg/m²     Physical Exam  Vitals and nursing note reviewed. Constitutional:       General: She is not in acute distress.      Appearance: She is well-developed. HENT:      Head: Normocephalic and atraumatic. Nose: Nose normal.   Eyes:      Conjunctiva/sclera: Conjunctivae normal.   Cardiovascular:      Rate and Rhythm: Normal rate and regular rhythm. Heart sounds: Normal heart sounds. Pulmonary:      Effort: Pulmonary effort is normal. No respiratory distress. Breath sounds: Normal breath sounds. No wheezing. Abdominal:      General: Bowel sounds are normal. There is no distension. Palpations: Abdomen is soft. Tenderness: There is no abdominal tenderness. Musculoskeletal:      Cervical back: Normal range of motion and neck supple. Skin:     General: Skin is warm and dry. Neurological:      Mental Status: She is alert and oriented to person, place, and time. Psychiatric:         Behavior: Behavior normal.       Assessment/Plan:     Jovi Patel was seen today for hypertension. Diagnoses and all orders for this visit:    Essential hypertension  -     She has a new diagnosis of hypertension so will start on Lisinopril and titrate up to 40 mg daily. -     lisinopril (PRINIVIL;ZESTRIL) 20 MG tablet; Take 1 tablet by mouth 2 times daily    Fibromyalgia  -     She doesn't feel the Amitriptyline is helping so will start on Cymbalta as it's been more effective in the past.  -     DULoxetine (CYMBALTA) 60 MG extended release capsule; Take 1 capsule by mouth daily      Return in about 1 week (around 9/20/2022) for hypertension (call/message report of BP in 1 week).     Electronically signed by Ingris Peace MD on 9/13/2022 at 3:05 PM

## 2022-09-15 RX ORDER — HYDROCHLOROTHIAZIDE 12.5 MG/1
12.5 TABLET ORAL DAILY
Qty: 90 TABLET | Refills: 0 | Status: SHIPPED | OUTPATIENT
Start: 2022-09-15

## 2022-10-02 DIAGNOSIS — M79.7 FIBROMYALGIA: ICD-10-CM

## 2022-10-03 RX ORDER — AMITRIPTYLINE HYDROCHLORIDE 50 MG/1
TABLET, FILM COATED ORAL
Qty: 90 TABLET | Refills: 0 | Status: SHIPPED | OUTPATIENT
Start: 2022-10-03

## 2022-10-06 DIAGNOSIS — I10 ESSENTIAL HYPERTENSION: ICD-10-CM

## 2022-10-06 RX ORDER — LISINOPRIL 20 MG/1
TABLET ORAL
Qty: 180 TABLET | Refills: 1 | Status: SHIPPED | OUTPATIENT
Start: 2022-10-06

## 2022-10-30 DIAGNOSIS — E78.00 PURE HYPERCHOLESTEROLEMIA: ICD-10-CM

## 2022-10-31 RX ORDER — ATORVASTATIN CALCIUM 40 MG/1
TABLET, FILM COATED ORAL
Qty: 90 TABLET | Refills: 1 | Status: SHIPPED | OUTPATIENT
Start: 2022-10-31

## 2022-11-04 DIAGNOSIS — M79.7 FIBROMYALGIA: ICD-10-CM

## 2022-11-04 RX ORDER — DULOXETIN HYDROCHLORIDE 60 MG/1
CAPSULE, DELAYED RELEASE ORAL
Qty: 30 CAPSULE | Refills: 1 | Status: SHIPPED | OUTPATIENT
Start: 2022-11-04 | End: 2022-11-10 | Stop reason: SDUPTHER

## 2022-11-10 DIAGNOSIS — M79.7 FIBROMYALGIA: ICD-10-CM

## 2022-11-10 RX ORDER — DULOXETIN HYDROCHLORIDE 60 MG/1
60 CAPSULE, DELAYED RELEASE ORAL DAILY
Qty: 30 CAPSULE | Refills: 2 | Status: SHIPPED | OUTPATIENT
Start: 2022-11-10

## 2022-11-10 RX ORDER — PREDNISONE 20 MG/1
40 TABLET ORAL DAILY
Qty: 10 TABLET | Refills: 0 | Status: SHIPPED | OUTPATIENT
Start: 2022-11-10 | End: 2022-11-22 | Stop reason: SDUPTHER

## 2022-11-21 NOTE — PROGRESS NOTES
[address]  [address] [place] [address]  Phone:  [phone]          Name: Juan Diego Hayward  : 1975    Chief Complaint   Patient presents with    Ear Problem       HPI:     Juan Diego Hayward is a 52 y.o. female who presents today for evaluation of ear pain. She feels like she has fluid in her right inner ear. She feels her balance is off. Her hearing is decreased. No fevers. No congestion. She has a smoker's cough. She's tried Swimmer's Ear and Debrox as well as Sudafed. Current Outpatient Medications:     predniSONE (DELTASONE) 20 MG tablet, Take 2 tablets by mouth daily, Disp: 10 tablet, Rfl: 0    DULoxetine (CYMBALTA) 60 MG extended release capsule, Take 1 capsule by mouth daily, Disp: 30 capsule, Rfl: 2    atorvastatin (LIPITOR) 40 MG tablet, take 1 tablet by mouth once daily, Disp: 90 tablet, Rfl: 1    lisinopril (PRINIVIL;ZESTRIL) 20 MG tablet, take 1 tablet by mouth twice a day, Disp: 180 tablet, Rfl: 1    hydroCHLOROthiazide (HYDRODIURIL) 12.5 MG tablet, Take 1 tablet by mouth daily, Disp: 90 tablet, Rfl: 0    Magnesium 500 MG CAPS, Take by mouth, Disp: , Rfl:     Allergies   Allergen Reactions    Eggs Or Egg-Derived Products     Bee Venom        Subjective:      Review of Systems   Constitutional:  Negative for fever. HENT:  Positive for ear pain. Negative for congestion, ear discharge and hearing loss. Objective:     /74 (Site: Left Upper Arm, Position: Sitting, Cuff Size: Large Adult)   Pulse (!) 101   Temp 97.4 °F (36.3 °C) (Temporal)   Resp 16   Ht 5' 7.01\" (1.702 m)   Wt 275 lb (124.7 kg)   SpO2 98%   BMI 43.06 kg/m²     Physical Exam  Vitals reviewed. Constitutional:       General: She is not in acute distress. Appearance: She is well-developed. HENT:      Head: Normocephalic and atraumatic. Right Ear: A middle ear effusion is present. Tympanic membrane is erythematous.       Left Ear: Tympanic membrane, ear canal and external ear normal.      Nose: Nose normal.   Eyes:      Conjunctiva/sclera: Conjunctivae normal.   Cardiovascular:      Rate and Rhythm: Normal rate and regular rhythm. Heart sounds: Normal heart sounds. Pulmonary:      Effort: Pulmonary effort is normal. No respiratory distress. Breath sounds: Normal breath sounds. No wheezing. Musculoskeletal:      Cervical back: Normal range of motion and neck supple. Skin:     General: Skin is warm and dry. Neurological:      Mental Status: She is alert and oriented to person, place, and time. Psychiatric:         Behavior: Behavior normal.       Assessment/Plan:     Reta Decker was seen today for ear problem. Diagnoses and all orders for this visit:    Eustachian tube dysfunction, right  -     cefTRIAXone (ROCEPHIN) injection 1,000 mg    Fibromyalgia  -     predniSONE (DELTASONE) 20 MG tablet; Take 2 tablets by mouth daily      Return if symptoms worsen or fail to improve.     Electronically signed by Julien Pradhan MD on 11/22/2022 at 3:41 PM

## 2022-11-22 ENCOUNTER — OFFICE VISIT (OUTPATIENT)
Dept: FAMILY MEDICINE CLINIC | Age: 47
End: 2022-11-22
Payer: COMMERCIAL

## 2022-11-22 VITALS
SYSTOLIC BLOOD PRESSURE: 128 MMHG | TEMPERATURE: 97.4 F | BODY MASS INDEX: 43.16 KG/M2 | OXYGEN SATURATION: 98 % | RESPIRATION RATE: 16 BRPM | WEIGHT: 275 LBS | HEART RATE: 101 BPM | HEIGHT: 67 IN | DIASTOLIC BLOOD PRESSURE: 74 MMHG

## 2022-11-22 DIAGNOSIS — M79.7 FIBROMYALGIA: ICD-10-CM

## 2022-11-22 DIAGNOSIS — H69.81 EUSTACHIAN TUBE DYSFUNCTION, RIGHT: Primary | ICD-10-CM

## 2022-11-22 PROCEDURE — 99213 OFFICE O/P EST LOW 20 MIN: CPT | Performed by: FAMILY MEDICINE

## 2022-11-22 PROCEDURE — 3074F SYST BP LT 130 MM HG: CPT | Performed by: FAMILY MEDICINE

## 2022-11-22 PROCEDURE — 96372 THER/PROPH/DIAG INJ SC/IM: CPT | Performed by: FAMILY MEDICINE

## 2022-11-22 PROCEDURE — 3078F DIAST BP <80 MM HG: CPT | Performed by: FAMILY MEDICINE

## 2022-11-22 RX ORDER — PREDNISONE 20 MG/1
40 TABLET ORAL DAILY
Qty: 10 TABLET | Refills: 0 | Status: SHIPPED | OUTPATIENT
Start: 2022-11-22

## 2022-11-22 RX ORDER — CEFTRIAXONE 1 G/1
1000 INJECTION, POWDER, FOR SOLUTION INTRAMUSCULAR; INTRAVENOUS ONCE
Status: COMPLETED | OUTPATIENT
Start: 2022-11-22 | End: 2022-11-22

## 2022-11-22 RX ADMIN — CEFTRIAXONE 1000 MG: 1 INJECTION, POWDER, FOR SOLUTION INTRAMUSCULAR; INTRAVENOUS at 15:50

## 2022-11-22 NOTE — PROGRESS NOTES
Administrations This Visit       cefTRIAXone (ROCEPHIN) injection 1,000 mg       Admin Date  11/22/2022  15:50 Action  Given Dose  1,000 mg Route  IntraMUSCular Site  Ventrogluteal Right Administered By  Brittany Bruce LPN    Ordering Provider: Rachele Esqueda MD    NDC: 9649-2608-96    Lot#: [id number]    : GRICEL/ Meeta 9    Patient Supplied?: No                    Patient instructed to remain in clinic for 20 minutes after injection and was advised to report any adverse reaction to me immediately.

## 2022-11-28 DIAGNOSIS — F43.22 ADJUSTMENT DISORDER WITH ANXIOUS MOOD: ICD-10-CM

## 2022-11-28 RX ORDER — ALPRAZOLAM 0.5 MG/1
0.5 TABLET ORAL 3 TIMES DAILY PRN
Qty: 21 TABLET | Refills: 0 | Status: SHIPPED | OUTPATIENT
Start: 2022-11-28 | End: 2022-12-05

## 2022-12-12 RX ORDER — HYDROCHLOROTHIAZIDE 12.5 MG/1
TABLET ORAL
Qty: 90 TABLET | Refills: 1 | Status: SHIPPED | OUTPATIENT
Start: 2022-12-12

## 2022-12-29 DIAGNOSIS — M79.7 FIBROMYALGIA: ICD-10-CM

## 2022-12-29 RX ORDER — AMITRIPTYLINE HYDROCHLORIDE 50 MG/1
TABLET, FILM COATED ORAL
Qty: 90 TABLET | Refills: 0 | Status: SHIPPED | OUTPATIENT
Start: 2022-12-29

## 2023-02-06 DIAGNOSIS — M79.7 FIBROMYALGIA: ICD-10-CM

## 2023-02-06 RX ORDER — DULOXETIN HYDROCHLORIDE 60 MG/1
CAPSULE, DELAYED RELEASE ORAL
Qty: 30 CAPSULE | Refills: 2 | Status: SHIPPED | OUTPATIENT
Start: 2023-02-06

## 2023-02-20 NOTE — PROGRESS NOTES
After obtaining consent, and per orders of Ronit Royal MD  Immunization(s) given during visit      Administrations This Visit     methylPREDNISolone acetate (DEPO-MEDROL) injection 80 mg     Admin Date  10/07/2020  16:04 Action  Given Dose  80 mg Route  Intramuscular Site  Dorsogluteal Left Administered By  Neo Hammer LPN    Ordering Provider:  Jose Pacheco MD    NDC:  2365-8651-88    Lot#:   [id number]    :  Gerry Asencio.     Patient Supplied?:  No Appt  with  pa  on 02/21/2023

## 2023-03-15 RX ORDER — HYDROXYZINE HYDROCHLORIDE 25 MG/1
25 TABLET, FILM COATED ORAL EVERY 8 HOURS PRN
Qty: 90 TABLET | Refills: 0 | Status: SHIPPED | OUTPATIENT
Start: 2023-03-15 | End: 2023-04-14

## 2023-03-15 RX ORDER — CEPHALEXIN 500 MG/1
500 CAPSULE ORAL 2 TIMES DAILY
Qty: 14 CAPSULE | Refills: 0 | Status: SHIPPED | OUTPATIENT
Start: 2023-03-15 | End: 2023-03-22

## 2023-04-01 DIAGNOSIS — M79.7 FIBROMYALGIA: ICD-10-CM

## 2023-04-03 RX ORDER — AMITRIPTYLINE HYDROCHLORIDE 50 MG/1
TABLET, FILM COATED ORAL
Qty: 90 TABLET | Refills: 1 | Status: SHIPPED | OUTPATIENT
Start: 2023-04-03

## 2023-04-07 DIAGNOSIS — I10 ESSENTIAL HYPERTENSION: ICD-10-CM

## 2023-04-07 RX ORDER — LISINOPRIL 20 MG/1
TABLET ORAL
Qty: 180 TABLET | Refills: 1 | Status: SHIPPED | OUTPATIENT
Start: 2023-04-07

## 2023-05-09 DIAGNOSIS — E78.00 PURE HYPERCHOLESTEROLEMIA: ICD-10-CM

## 2023-05-09 RX ORDER — ATORVASTATIN CALCIUM 40 MG/1
TABLET, FILM COATED ORAL
Qty: 90 TABLET | Refills: 1 | Status: SHIPPED | OUTPATIENT
Start: 2023-05-09

## 2023-05-12 DIAGNOSIS — M79.7 FIBROMYALGIA: ICD-10-CM

## 2023-05-12 RX ORDER — DULOXETIN HYDROCHLORIDE 60 MG/1
CAPSULE, DELAYED RELEASE ORAL
Qty: 30 CAPSULE | Refills: 2 | Status: SHIPPED | OUTPATIENT
Start: 2023-05-12

## 2023-06-11 PROBLEM — F33.1 MODERATE EPISODE OF RECURRENT MAJOR DEPRESSIVE DISORDER (HCC): Status: ACTIVE | Noted: 2023-06-11

## 2023-06-27 RX ORDER — HYDROCHLOROTHIAZIDE 12.5 MG/1
TABLET ORAL
Qty: 90 TABLET | Refills: 1 | Status: SHIPPED | OUTPATIENT
Start: 2023-06-27

## 2023-07-12 RX ORDER — NITROFURANTOIN 25; 75 MG/1; MG/1
100 CAPSULE ORAL 2 TIMES DAILY
Qty: 10 CAPSULE | Refills: 0 | Status: SHIPPED | OUTPATIENT
Start: 2023-07-12 | End: 2023-07-17

## 2023-09-28 DIAGNOSIS — M79.7 FIBROMYALGIA: ICD-10-CM

## 2023-09-28 RX ORDER — AMITRIPTYLINE HYDROCHLORIDE 50 MG/1
TABLET, FILM COATED ORAL
Qty: 90 TABLET | Refills: 1 | Status: SHIPPED | OUTPATIENT
Start: 2023-09-28

## 2023-10-16 ASSESSMENT — ENCOUNTER SYMPTOMS
DIARRHEA: 0
COUGH: 0
RHINORRHEA: 0
CONSTIPATION: 0
BLOOD IN STOOL: 0
SHORTNESS OF BREATH: 0
VOMITING: 0
NAUSEA: 0
EYE REDNESS: 0

## 2023-10-16 NOTE — PROGRESS NOTES
(CYMBALTA) 60 MG extended release capsule      4. Primary insomnia  traZODone (DESYREL) 50 MG tablet          Plan:     Orders Placed This Encounter   Medications    amitriptyline (ELAVIL) 50 MG tablet     Sig: Take 1 tablet by mouth nightly     Dispense:  90 tablet     Refill:  1    traZODone (DESYREL) 50 MG tablet     Sig: Take 1 tablet by mouth nightly as needed for Sleep     Dispense:  30 tablet     Refill:  5    DULoxetine (CYMBALTA) 60 MG extended release capsule     Sig: Take 1 capsule by mouth 2 times daily     Dispense:  180 capsule     Refill:  1    meloxicam (MOBIC) 15 MG tablet     Sig: Take 1 tablet by mouth daily     Dispense:  30 tablet     Refill:  5       Counseling Completed:  Tobacco and secondary smoke risks reviewed; instructed on cessation and avoidance. Repeat pap every 3 - 5 years if negative for women over the age of 27. Every 3 years under 27years old. Mammograms every 1 year if age > 35 yo and last mammogram was negative. Colon cancer screening reviewed age > 48, or < if indicated. Labs ordered, if indicated. Influenza vaccine recommended, if in season. Pneumonia vaccination if > 65 or indicated. Routine eye and dental exams advised. Exercise and healthy diet discussed. Return in about 6 months (around 4/18/2024) for hypertension, depression.     Electronically signed by Guicho Reardon MD on 10/18/2023 at 4:26 PM

## 2023-10-18 ENCOUNTER — OFFICE VISIT (OUTPATIENT)
Dept: FAMILY MEDICINE CLINIC | Age: 48
End: 2023-10-18
Payer: COMMERCIAL

## 2023-10-18 VITALS
SYSTOLIC BLOOD PRESSURE: 128 MMHG | HEART RATE: 68 BPM | DIASTOLIC BLOOD PRESSURE: 72 MMHG | RESPIRATION RATE: 16 BRPM | TEMPERATURE: 98.8 F | OXYGEN SATURATION: 98 %

## 2023-10-18 DIAGNOSIS — G89.29 CHRONIC PAIN OF BOTH SHOULDERS: ICD-10-CM

## 2023-10-18 DIAGNOSIS — Z00.00 WELL ADULT EXAM: Primary | ICD-10-CM

## 2023-10-18 DIAGNOSIS — M25.511 CHRONIC PAIN OF BOTH SHOULDERS: ICD-10-CM

## 2023-10-18 DIAGNOSIS — F51.01 PRIMARY INSOMNIA: ICD-10-CM

## 2023-10-18 DIAGNOSIS — M25.512 CHRONIC PAIN OF BOTH SHOULDERS: ICD-10-CM

## 2023-10-18 DIAGNOSIS — M79.7 FIBROMYALGIA: ICD-10-CM

## 2023-10-18 PROCEDURE — 99396 PREV VISIT EST AGE 40-64: CPT | Performed by: FAMILY MEDICINE

## 2023-10-18 PROCEDURE — 3074F SYST BP LT 130 MM HG: CPT | Performed by: FAMILY MEDICINE

## 2023-10-18 PROCEDURE — 3078F DIAST BP <80 MM HG: CPT | Performed by: FAMILY MEDICINE

## 2023-10-18 RX ORDER — AMITRIPTYLINE HYDROCHLORIDE 50 MG/1
50 TABLET, FILM COATED ORAL NIGHTLY
Qty: 90 TABLET | Refills: 1 | Status: SHIPPED | OUTPATIENT
Start: 2023-10-18

## 2023-10-18 RX ORDER — DULOXETIN HYDROCHLORIDE 60 MG/1
60 CAPSULE, DELAYED RELEASE ORAL 2 TIMES DAILY
Qty: 180 CAPSULE | Refills: 1 | Status: SHIPPED | OUTPATIENT
Start: 2023-10-18

## 2023-10-18 RX ORDER — MELOXICAM 15 MG/1
15 TABLET ORAL DAILY
Qty: 30 TABLET | Refills: 5 | Status: SHIPPED | OUTPATIENT
Start: 2023-10-18

## 2023-10-18 RX ORDER — TRAZODONE HYDROCHLORIDE 50 MG/1
50 TABLET ORAL NIGHTLY PRN
Qty: 30 TABLET | Refills: 5 | Status: SHIPPED | OUTPATIENT
Start: 2023-10-18

## 2023-10-18 ASSESSMENT — PATIENT HEALTH QUESTIONNAIRE - PHQ9
1. LITTLE INTEREST OR PLEASURE IN DOING THINGS: 0
SUM OF ALL RESPONSES TO PHQ QUESTIONS 1-9: 0
2. FEELING DOWN, DEPRESSED OR HOPELESS: 0
SUM OF ALL RESPONSES TO PHQ QUESTIONS 1-9: 0
SUM OF ALL RESPONSES TO PHQ9 QUESTIONS 1 & 2: 0

## 2023-12-01 DIAGNOSIS — F33.1 MODERATE EPISODE OF RECURRENT MAJOR DEPRESSIVE DISORDER (HCC): ICD-10-CM

## 2023-12-01 RX ORDER — BUSPIRONE HYDROCHLORIDE 10 MG/1
10 TABLET ORAL 2 TIMES DAILY
Qty: 60 TABLET | Refills: 5 | Status: SHIPPED | OUTPATIENT
Start: 2023-12-01

## 2024-02-14 DIAGNOSIS — M79.601 RIGHT ARM PAIN: Primary | ICD-10-CM

## 2024-02-14 RX ORDER — GABAPENTIN 300 MG/1
300 CAPSULE ORAL 2 TIMES DAILY
Qty: 60 CAPSULE | Refills: 5 | Status: SHIPPED | OUTPATIENT
Start: 2024-02-14 | End: 2024-08-12

## 2024-02-14 RX ORDER — PREDNISONE 10 MG/1
10 TABLET ORAL DAILY
Qty: 45 TABLET | Refills: 0 | Status: SHIPPED | OUTPATIENT
Start: 2024-02-14

## 2024-03-27 DIAGNOSIS — E66.01 MORBID OBESITY WITH BODY MASS INDEX (BMI) OF 40.0 TO 44.9 IN ADULT (HCC): Primary | ICD-10-CM

## 2024-03-28 ENCOUNTER — TELEPHONE (OUTPATIENT)
Dept: FAMILY MEDICINE CLINIC | Age: 49
End: 2024-03-28

## 2024-03-28 NOTE — TELEPHONE ENCOUNTER
Request Reference Number: [id number]. WEGOVY INJ 0.25MG is denied due to Plan Exclusion. For further questions, call [phone]. PAAppealSupportedResponse Case ID: [id number]      Payer: [employer] Patient's Payer    [phone]   Electronic appeal: Not supported

## 2024-03-28 NOTE — TELEPHONE ENCOUNTER
Wegovy     [facility]  Insurance response time is 5-7 business days.

## 2024-03-31 DIAGNOSIS — I10 ESSENTIAL HYPERTENSION: ICD-10-CM

## 2024-04-01 DIAGNOSIS — M79.7 FIBROMYALGIA: ICD-10-CM

## 2024-04-01 RX ORDER — PREDNISONE 20 MG/1
40 TABLET ORAL DAILY
Qty: 10 TABLET | Refills: 0 | Status: SHIPPED | OUTPATIENT
Start: 2024-04-01

## 2024-04-01 RX ORDER — LISINOPRIL 20 MG/1
20 TABLET ORAL 2 TIMES DAILY
Qty: 180 TABLET | Refills: 1 | Status: SHIPPED | OUTPATIENT
Start: 2024-04-01

## 2024-04-08 DIAGNOSIS — M79.7 FIBROMYALGIA: ICD-10-CM

## 2024-04-08 RX ORDER — DULOXETIN HYDROCHLORIDE 60 MG/1
60 CAPSULE, DELAYED RELEASE ORAL 2 TIMES DAILY
Qty: 180 CAPSULE | Refills: 1 | Status: SHIPPED | OUTPATIENT
Start: 2024-04-08

## 2024-04-11 DIAGNOSIS — M25.511 CHRONIC PAIN OF BOTH SHOULDERS: ICD-10-CM

## 2024-04-11 DIAGNOSIS — M25.512 CHRONIC PAIN OF BOTH SHOULDERS: ICD-10-CM

## 2024-04-11 DIAGNOSIS — G89.29 CHRONIC PAIN OF BOTH SHOULDERS: ICD-10-CM

## 2024-04-11 RX ORDER — MELOXICAM 15 MG/1
15 TABLET ORAL DAILY
Qty: 90 TABLET | Refills: 1 | Status: SHIPPED | OUTPATIENT
Start: 2024-04-11

## 2024-05-06 DIAGNOSIS — E78.00 PURE HYPERCHOLESTEROLEMIA: ICD-10-CM

## 2024-05-06 RX ORDER — ATORVASTATIN CALCIUM 40 MG/1
40 TABLET, FILM COATED ORAL DAILY
Qty: 90 TABLET | Refills: 1 | Status: SHIPPED | OUTPATIENT
Start: 2024-05-06

## 2024-05-06 NOTE — TELEPHONE ENCOUNTER
Last visit- 10/18/2023  Next visit- Visit date not found    Requested Prescriptions     Pending Prescriptions Disp Refills    atorvastatin (LIPITOR) 40 MG tablet 90 tablet 1     Sig: Take 1 tablet by mouth daily

## 2024-06-07 ENCOUNTER — TELEPHONE (OUTPATIENT)
Dept: FAMILY MEDICINE CLINIC | Age: 49
End: 2024-06-07

## 2024-06-07 DIAGNOSIS — I10 ESSENTIAL HYPERTENSION: Primary | ICD-10-CM

## 2024-06-07 RX ORDER — LISINOPRIL 20 MG/1
20 TABLET ORAL 2 TIMES DAILY
Qty: 180 TABLET | Refills: 1 | Status: SHIPPED | OUTPATIENT
Start: 2024-06-07

## 2024-06-17 RX ORDER — HYDROCHLOROTHIAZIDE 12.5 MG/1
TABLET ORAL
Qty: 90 TABLET | Refills: 1 | Status: SHIPPED | OUTPATIENT
Start: 2024-06-17

## 2024-10-02 DIAGNOSIS — F33.1 MODERATE EPISODE OF RECURRENT MAJOR DEPRESSIVE DISORDER (HCC): ICD-10-CM

## 2024-10-02 DIAGNOSIS — F41.9 ANXIETY: Primary | ICD-10-CM

## 2024-10-02 RX ORDER — BUSPIRONE HYDROCHLORIDE 10 MG/1
20 TABLET ORAL 2 TIMES DAILY
Qty: 120 TABLET | Refills: 5 | Status: SHIPPED | OUTPATIENT
Start: 2024-10-02

## 2024-10-02 RX ORDER — HYDROXYZINE HYDROCHLORIDE 25 MG/1
25 TABLET, FILM COATED ORAL EVERY 8 HOURS PRN
Qty: 90 TABLET | Refills: 0 | Status: SHIPPED | OUTPATIENT
Start: 2024-10-02 | End: 2024-11-01

## 2024-11-25 DIAGNOSIS — M79.7 FIBROMYALGIA: ICD-10-CM

## 2024-11-25 RX ORDER — DULOXETIN HYDROCHLORIDE 60 MG/1
60 CAPSULE, DELAYED RELEASE ORAL 2 TIMES DAILY
Qty: 180 CAPSULE | Refills: 1 | Status: SHIPPED | OUTPATIENT
Start: 2024-11-25

## 2024-12-30 DIAGNOSIS — M25.551 BILATERAL HIP PAIN: Primary | ICD-10-CM

## 2024-12-30 DIAGNOSIS — M25.552 BILATERAL HIP PAIN: Primary | ICD-10-CM

## 2024-12-30 RX ORDER — KETOROLAC TROMETHAMINE 10 MG/1
10 TABLET, FILM COATED ORAL EVERY 6 HOURS PRN
Qty: 20 TABLET | Refills: 0 | Status: SHIPPED | OUTPATIENT
Start: 2024-12-30 | End: 2025-12-30

## 2024-12-30 NOTE — PROGRESS NOTES
Patient reports hip pain after carrying 20 lb boxes of alyx upstairs in her daughter's house this weekend.  The pain was so miserable she almost went to urgent care.  She tried one of her 's Toradol and had some relief so an Rx was sent in to be used sparingly.

## 2025-02-12 RX ORDER — PREDNISONE 20 MG/1
40 TABLET ORAL DAILY
Qty: 10 TABLET | Refills: 0 | Status: SHIPPED | OUTPATIENT
Start: 2025-02-12

## 2025-02-12 RX ORDER — ALBUTEROL SULFATE 90 UG/1
2 INHALANT RESPIRATORY (INHALATION) 4 TIMES DAILY PRN
Qty: 18 G | Refills: 1 | Status: SHIPPED | OUTPATIENT
Start: 2025-02-12

## 2025-02-12 RX ORDER — AZITHROMYCIN 250 MG/1
TABLET, FILM COATED ORAL
Qty: 6 TABLET | Refills: 0 | Status: SHIPPED | OUTPATIENT
Start: 2025-02-12

## 2025-02-20 DIAGNOSIS — F41.9 ANXIETY: ICD-10-CM

## 2025-02-20 DIAGNOSIS — I10 ESSENTIAL HYPERTENSION: ICD-10-CM

## 2025-02-20 RX ORDER — BUSPIRONE HYDROCHLORIDE 30 MG/1
30 TABLET ORAL 2 TIMES DAILY
Qty: 180 TABLET | Refills: 1 | Status: SHIPPED | OUTPATIENT
Start: 2025-02-20

## 2025-02-20 RX ORDER — LISINOPRIL 20 MG/1
20 TABLET ORAL 2 TIMES DAILY
Qty: 180 TABLET | Refills: 1 | Status: SHIPPED | OUTPATIENT
Start: 2025-02-20

## 2025-03-13 DIAGNOSIS — Z00.00 HEALTHCARE MAINTENANCE: Primary | ICD-10-CM

## 2025-03-13 DIAGNOSIS — Z12.31 ENCOUNTER FOR SCREENING MAMMOGRAM FOR BREAST CANCER: ICD-10-CM

## 2025-03-26 DIAGNOSIS — F17.200 CURRENT EVERY DAY SMOKER: Primary | ICD-10-CM

## 2025-03-26 RX ORDER — BUPROPION HYDROCHLORIDE 150 MG/1
150 TABLET, EXTENDED RELEASE ORAL 2 TIMES DAILY
Qty: 180 TABLET | Refills: 0 | Status: SHIPPED | OUTPATIENT
Start: 2025-03-26

## 2025-03-27 ENCOUNTER — HOSPITAL ENCOUNTER (OUTPATIENT)
Age: 50
Discharge: HOME | End: 2025-03-27
Payer: COMMERCIAL

## 2025-03-27 DIAGNOSIS — Z00.00 HEALTHCARE MAINTENANCE: ICD-10-CM

## 2025-03-27 LAB
BASOPHILS ABSOLUTE: 0.1 THOU/MM3 (ref 0–0.1)
BASOPHILS NFR BLD AUTO: 0.8 %
DEPRECATED RDW RBC AUTO: 47.1 FL (ref 35–45)
EOSINOPHIL NFR BLD AUTO: 2 %
EOSINOPHILS ABSOLUTE: 0.2 THOU/MM3 (ref 0–0.4)
ERYTHROCYTE [DISTWIDTH] IN BLOOD BY AUTOMATED COUNT: 13.6 % (ref 11.5–14.5)
HCT VFR BLD AUTO: 40.5 % (ref 37–47)
HGB BLD-MCNC: 12.9 GM/DL (ref 12–16)
IMM GRANULOCYTES # BLD AUTO: 0.02 THOU/MM3 (ref 0–0.07)
IMM GRANULOCYTES NFR BLD AUTO: 0.2 %
LYMPHOCYTES ABSOLUTE: 2.7 THOU/MM3 (ref 1–4.8)
LYMPHOCYTES NFR BLD AUTO: 30.4 %
MCH RBC QN AUTO: 30.4 PG (ref 26–33)
MCHC RBC AUTO-ENTMCNC: 31.9 GM/DL (ref 32.2–35.5)
MCV RBC AUTO: 95.3 FL (ref 81–99)
MONOCYTES ABSOLUTE: 0.6 THOU/MM3 (ref 0.4–1.3)
MONOCYTES NFR BLD AUTO: 6.5 %
NEUTROPHILS ABSOLUTE: 5.4 THOU/MM3 (ref 1.8–7.7)
NEUTROPHILS NFR BLD AUTO: 60.1 %
NRBC BLD AUTO-RTO: 0 /100 WBC
PLATELET # BLD AUTO: 239 THOU/MM3 (ref 130–400)
PMV BLD AUTO: 10.5 FL (ref 9.4–12.4)
RBC # BLD AUTO: 4.25 MILL/MM3 (ref 4.2–5.4)
WBC # BLD AUTO: 9 THOU/MM3 (ref 4.8–10.8)

## 2025-03-27 PROCEDURE — 80061 LIPID PANEL: CPT

## 2025-03-27 PROCEDURE — 85025 COMPLETE CBC W/AUTO DIFF WBC: CPT

## 2025-03-27 PROCEDURE — 80053 COMPREHEN METABOLIC PANEL: CPT

## 2025-03-27 PROCEDURE — 36415 COLL VENOUS BLD VENIPUNCTURE: CPT

## 2025-03-28 ENCOUNTER — RESULTS FOLLOW-UP (OUTPATIENT)
Dept: FAMILY MEDICINE CLINIC | Age: 50
End: 2025-03-28

## 2025-03-28 LAB
ALBUMIN SERPL BCG-MCNC: 4.4 G/DL (ref 3.4–4.9)
ALP SERPL-CCNC: 86 U/L (ref 35–104)
ALT SERPL W/O P-5'-P-CCNC: 20 U/L (ref 10–35)
ANION GAP SERPL CALC-SCNC: 10 MEQ/L (ref 8–16)
AST SERPL-CCNC: 20 U/L (ref 10–35)
BILIRUB SERPL-MCNC: 0.2 MG/DL (ref 0.3–1.2)
BUN SERPL-MCNC: 10 MG/DL (ref 8–23)
CALCIUM SERPL-MCNC: 9.8 MG/DL (ref 8.6–10)
CHLORIDE SERPL-SCNC: 105 MEQ/L (ref 98–111)
CHOLEST SERPL-MCNC: 216 MG/DL (ref 100–199)
CO2 SERPL-SCNC: 26 MEQ/L (ref 22–29)
CREAT SERPL-MCNC: 0.7 MG/DL (ref 0.5–0.9)
GFR SERPL CREATININE-BSD FRML MDRD: > 90 ML/MIN/1.73M2
GLUCOSE SERPL-MCNC: 76 MG/DL (ref 74–109)
HDLC SERPL-MCNC: 41 MG/DL
LDLC SERPL CALC-MCNC: 125 MG/DL
POTASSIUM SERPL-SCNC: 4.8 MEQ/L (ref 3.5–5.2)
PROT SERPL-MCNC: 7.3 G/DL (ref 6.4–8.3)
SODIUM SERPL-SCNC: 141 MEQ/L (ref 135–145)
TRIGL SERPL-MCNC: 250 MG/DL (ref 0–199)

## 2025-03-28 RX ORDER — ALBUTEROL SULFATE 90 UG/1
INHALANT RESPIRATORY (INHALATION)
Qty: 9 G | Refills: 0 | Status: SHIPPED | OUTPATIENT
Start: 2025-03-28

## 2025-03-31 ASSESSMENT — PATIENT HEALTH QUESTIONNAIRE - PHQ9
SUM OF ALL RESPONSES TO PHQ QUESTIONS 1-9: 23
9. THOUGHTS THAT YOU WOULD BE BETTER OFF DEAD, OR OF HURTING YOURSELF: NOT AT ALL
SUM OF ALL RESPONSES TO PHQ QUESTIONS 1-9: 23
8. MOVING OR SPEAKING SO SLOWLY THAT OTHER PEOPLE COULD HAVE NOTICED. OR THE OPPOSITE, BEING SO FIGETY OR RESTLESS THAT YOU HAVE BEEN MOVING AROUND A LOT MORE THAN USUAL: NEARLY EVERY DAY
1. LITTLE INTEREST OR PLEASURE IN DOING THINGS: NEARLY EVERY DAY
6. FEELING BAD ABOUT YOURSELF - OR THAT YOU ARE A FAILURE OR HAVE LET YOURSELF OR YOUR FAMILY DOWN: NEARLY EVERY DAY
2. FEELING DOWN, DEPRESSED OR HOPELESS: NEARLY EVERY DAY
7. TROUBLE CONCENTRATING ON THINGS, SUCH AS READING THE NEWSPAPER OR WATCHING TELEVISION: NEARLY EVERY DAY
5. POOR APPETITE OR OVEREATING: NEARLY EVERY DAY
SUM OF ALL RESPONSES TO PHQ QUESTIONS 1-9: 23
10. IF YOU CHECKED OFF ANY PROBLEMS, HOW DIFFICULT HAVE THESE PROBLEMS MADE IT FOR YOU TO DO YOUR WORK, TAKE CARE OF THINGS AT HOME, OR GET ALONG WITH OTHER PEOPLE: VERY DIFFICULT
10. IF YOU CHECKED OFF ANY PROBLEMS, HOW DIFFICULT HAVE THESE PROBLEMS MADE IT FOR YOU TO DO YOUR WORK, TAKE CARE OF THINGS AT HOME, OR GET ALONG WITH OTHER PEOPLE: VERY DIFFICULT
SUM OF ALL RESPONSES TO PHQ QUESTIONS 1-9: 23
SUM OF ALL RESPONSES TO PHQ QUESTIONS 1-9: 23
2. FEELING DOWN, DEPRESSED OR HOPELESS: NEARLY EVERY DAY
8. MOVING OR SPEAKING SO SLOWLY THAT OTHER PEOPLE COULD HAVE NOTICED. OR THE OPPOSITE - BEING SO FIDGETY OR RESTLESS THAT YOU HAVE BEEN MOVING AROUND A LOT MORE THAN USUAL: NEARLY EVERY DAY
4. FEELING TIRED OR HAVING LITTLE ENERGY: NEARLY EVERY DAY
5. POOR APPETITE OR OVEREATING: NEARLY EVERY DAY
3. TROUBLE FALLING OR STAYING ASLEEP: MORE THAN HALF THE DAYS
4. FEELING TIRED OR HAVING LITTLE ENERGY: NEARLY EVERY DAY
7. TROUBLE CONCENTRATING ON THINGS, SUCH AS READING THE NEWSPAPER OR WATCHING TELEVISION: NEARLY EVERY DAY
9. THOUGHTS THAT YOU WOULD BE BETTER OFF DEAD, OR OF HURTING YOURSELF: NOT AT ALL
1. LITTLE INTEREST OR PLEASURE IN DOING THINGS: NEARLY EVERY DAY
3. TROUBLE FALLING OR STAYING ASLEEP: MORE THAN HALF THE DAYS
6. FEELING BAD ABOUT YOURSELF - OR THAT YOU ARE A FAILURE OR HAVE LET YOURSELF OR YOUR FAMILY DOWN: NEARLY EVERY DAY

## 2025-03-31 ASSESSMENT — ENCOUNTER SYMPTOMS
BLOOD IN STOOL: 0
NAUSEA: 0
VOMITING: 0
RHINORRHEA: 0
EYE REDNESS: 0
DIARRHEA: 0
SHORTNESS OF BREATH: 0
CONSTIPATION: 0
COUGH: 0

## 2025-03-31 ASSESSMENT — COLUMBIA-SUICIDE SEVERITY RATING SCALE - C-SSRS
1. IN THE PAST MONTH, HAVE YOU WISHED YOU WERE DEAD OR WISHED YOU COULD GO TO SLEEP AND NOT WAKE UP?: NO
2. IN THE PAST MONTH, HAVE YOU ACTUALLY HAD ANY THOUGHTS OF KILLING YOURSELF?: NO
6. IN YOUR LIFETIME, HAVE YOU EVER DONE ANYTHING, STARTED TO DO ANYTHING, OR PREPARED TO DO ANYTHING TO END YOUR LIFE?: NO

## 2025-03-31 NOTE — PROGRESS NOTES
[facility]  [address]  [place] [address]  Phone:  [phone]         WELLNESS EXAM       Name: Ava Dang  : 1975          Chief Complaint:     Chief Complaint   Patient presents with    Annual Exam       History of Present Illness:      Ava Dnag is a 49 y.o.  female who presents today for a health maintenance examination.  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  She has previously declined mammograms and colon cancer screening but has a new 1 month-old grandson Tarik who is making her see things differently.  She started smoking when she was 17 yo but quit smoking 2 days ago with Wellbutrin.  The first day was awful.  Yesterday was better.  She was able to function at work.      She questions if she may have lupus.  She gets a rash on her neck but not her face.  She doesn't want to see a rheumatologist.  She has a lot of arthritis pain.      Lab Results   Component Value Date     2025    K 4.8 2025     2025    CO2 26 2025    BUN 10 2025    CREATININE 0.7 2025    GLUCOSE 76 2025    CALCIUM 9.8 2025    BILITOT 0.2 (L) 2025    ALKPHOS 86 2025    AST 20 2025    ALT 20 2025    LABGLOM > 90 2025     Lab Results   Component Value Date    WBC 9.0 2025    HGB 12.9 2025    HCT 40.5 2025    MCV 95.3 2025     2025     Lab Results   Component Value Date    CHOL 216 (H) 2025    TRIG 250 (H) 2025    HDL 41 2025     Lab Results   Component Value Date    ALT 20 2025    AST 20 2025          Health Maintenance  Smoker?:  just quit 2 days ago  Healthy diet?:  Tries  Routine exercise?:  Very active at work  Routine dental exams?:  No, has dentures  Routine eye exams?:  Yes, annually, has glasses  Last mammogram:  order given  Last colon cancer screening:  declines  Last PAP:  ;

## 2025-04-02 ENCOUNTER — OFFICE VISIT (OUTPATIENT)
Dept: FAMILY MEDICINE CLINIC | Age: 50
End: 2025-04-02
Payer: COMMERCIAL

## 2025-04-02 VITALS
OXYGEN SATURATION: 98 % | DIASTOLIC BLOOD PRESSURE: 76 MMHG | SYSTOLIC BLOOD PRESSURE: 116 MMHG | BODY MASS INDEX: 42.12 KG/M2 | RESPIRATION RATE: 16 BRPM | TEMPERATURE: 97.7 F | HEART RATE: 76 BPM | HEIGHT: 67 IN

## 2025-04-02 DIAGNOSIS — Z00.00 WELL ADULT EXAM: Primary | ICD-10-CM

## 2025-04-02 DIAGNOSIS — M79.601 RIGHT ARM PAIN: ICD-10-CM

## 2025-04-02 DIAGNOSIS — M25.511 CHRONIC PAIN OF BOTH SHOULDERS: ICD-10-CM

## 2025-04-02 DIAGNOSIS — I10 ESSENTIAL HYPERTENSION: ICD-10-CM

## 2025-04-02 DIAGNOSIS — M79.7 FIBROMYALGIA: ICD-10-CM

## 2025-04-02 DIAGNOSIS — M25.512 CHRONIC PAIN OF BOTH SHOULDERS: ICD-10-CM

## 2025-04-02 DIAGNOSIS — G89.29 CHRONIC PAIN OF BOTH SHOULDERS: ICD-10-CM

## 2025-04-02 DIAGNOSIS — E78.00 PURE HYPERCHOLESTEROLEMIA: ICD-10-CM

## 2025-04-02 PROCEDURE — 3078F DIAST BP <80 MM HG: CPT | Performed by: FAMILY MEDICINE

## 2025-04-02 PROCEDURE — 3074F SYST BP LT 130 MM HG: CPT | Performed by: FAMILY MEDICINE

## 2025-04-02 PROCEDURE — 99396 PREV VISIT EST AGE 40-64: CPT | Performed by: FAMILY MEDICINE

## 2025-04-02 RX ORDER — MELOXICAM 15 MG/1
15 TABLET ORAL DAILY
Qty: 90 TABLET | Refills: 3 | Status: SHIPPED | OUTPATIENT
Start: 2025-04-02

## 2025-04-02 RX ORDER — ATORVASTATIN CALCIUM 80 MG/1
80 TABLET, FILM COATED ORAL DAILY
Qty: 90 TABLET | Refills: 3 | Status: SHIPPED | OUTPATIENT
Start: 2025-04-02

## 2025-04-02 RX ORDER — LISINOPRIL 40 MG/1
40 TABLET ORAL DAILY
Qty: 90 TABLET | Refills: 3 | Status: SHIPPED | OUTPATIENT
Start: 2025-04-02

## 2025-04-02 RX ORDER — DULOXETIN HYDROCHLORIDE 60 MG/1
60 CAPSULE, DELAYED RELEASE ORAL 2 TIMES DAILY
Qty: 180 CAPSULE | Refills: 3 | Status: SHIPPED | OUTPATIENT
Start: 2025-04-02

## 2025-04-02 RX ORDER — LISINOPRIL 40 MG/1
40 TABLET ORAL 2 TIMES DAILY
Qty: 90 TABLET | Refills: 3 | Status: SHIPPED | OUTPATIENT
Start: 2025-04-02 | End: 2025-04-02 | Stop reason: SDUPTHER

## 2025-04-02 RX ORDER — HYDROCHLOROTHIAZIDE 12.5 MG/1
12.5 TABLET ORAL DAILY
Qty: 90 TABLET | Refills: 3 | Status: SHIPPED | OUTPATIENT
Start: 2025-04-02

## 2025-04-02 RX ORDER — GABAPENTIN 300 MG/1
300 CAPSULE ORAL 2 TIMES DAILY
Qty: 60 CAPSULE | Refills: 5 | Status: SHIPPED | OUTPATIENT
Start: 2025-04-02 | End: 2025-09-29

## 2025-04-02 SDOH — ECONOMIC STABILITY: FOOD INSECURITY: WITHIN THE PAST 12 MONTHS, YOU WORRIED THAT YOUR FOOD WOULD RUN OUT BEFORE YOU GOT MONEY TO BUY MORE.: NEVER TRUE

## 2025-04-02 SDOH — ECONOMIC STABILITY: FOOD INSECURITY: WITHIN THE PAST 12 MONTHS, THE FOOD YOU BOUGHT JUST DIDN'T LAST AND YOU DIDN'T HAVE MONEY TO GET MORE.: NEVER TRUE

## 2025-04-02 ASSESSMENT — ENCOUNTER SYMPTOMS
WHEEZING: 0
EYE DISCHARGE: 0

## 2025-04-23 RX ORDER — ALBUTEROL SULFATE 90 UG/1
INHALANT RESPIRATORY (INHALATION)
Qty: 9 G | Refills: 0 | Status: SHIPPED | OUTPATIENT
Start: 2025-04-23

## 2025-04-23 NOTE — TELEPHONE ENCOUNTER
Last visit- 4/2/2025  Next visit- Visit date not found    Requested Prescriptions     Pending Prescriptions Disp Refills    albuterol sulfate HFA (PROVENTIL;VENTOLIN;PROAIR) 108 (90 Base) MCG/ACT inhaler [Pharmacy Med Name: Albuterol Sulfate  (90 Base) MCG/ACT Inhalation Aerosol Solution] 9 g 0     Sig: INHALE 2 PUFFS BY MOUTH 4 TIMES DAILY AS NEEDED FOR WHEEZING

## 2025-05-07 RX ORDER — PREDNISONE 20 MG/1
TABLET ORAL
Qty: 10 TABLET | Refills: 0 | Status: SHIPPED | OUTPATIENT
Start: 2025-05-07

## 2025-05-15 RX ORDER — ALBUTEROL SULFATE 90 UG/1
INHALANT RESPIRATORY (INHALATION)
Qty: 9 G | Refills: 0 | Status: SHIPPED | OUTPATIENT
Start: 2025-05-15

## 2025-06-04 RX ORDER — AMOXICILLIN 500 MG/1
500 CAPSULE ORAL 2 TIMES DAILY
Qty: 20 CAPSULE | Refills: 0 | Status: SHIPPED | OUTPATIENT
Start: 2025-06-04 | End: 2025-06-14

## 2025-07-09 DIAGNOSIS — F17.200 CURRENT EVERY DAY SMOKER: ICD-10-CM

## 2025-07-09 DIAGNOSIS — F41.9 ANXIETY: ICD-10-CM

## 2025-07-09 RX ORDER — BUPROPION HYDROCHLORIDE 150 MG/1
TABLET, EXTENDED RELEASE ORAL
Qty: 270 TABLET | Refills: 0 | Status: SHIPPED | OUTPATIENT
Start: 2025-07-09

## 2025-07-09 RX ORDER — BUSPIRONE HYDROCHLORIDE 30 MG/1
30 TABLET ORAL 2 TIMES DAILY
Qty: 180 TABLET | Refills: 1 | Status: SHIPPED | OUTPATIENT
Start: 2025-07-09

## 2025-07-09 RX ORDER — HYDROXYZINE HYDROCHLORIDE 25 MG/1
25 TABLET, FILM COATED ORAL EVERY 8 HOURS PRN
Qty: 90 TABLET | Refills: 0 | Status: SHIPPED | OUTPATIENT
Start: 2025-07-09 | End: 2025-08-08